# Patient Record
Sex: FEMALE | URBAN - METROPOLITAN AREA
[De-identification: names, ages, dates, MRNs, and addresses within clinical notes are randomized per-mention and may not be internally consistent; named-entity substitution may affect disease eponyms.]

---

## 2017-12-15 ENCOUNTER — FOLLOW UP (OUTPATIENT)
Dept: URBAN - METROPOLITAN AREA CLINIC 11 | Facility: CLINIC | Age: 72
End: 2017-12-15

## 2017-12-15 DIAGNOSIS — H43.813: ICD-10-CM

## 2017-12-15 PROCEDURE — 92014 COMPRE OPH EXAM EST PT 1/>: CPT

## 2017-12-15 ASSESSMENT — VISUAL ACUITY
OD_SC: 20/30-1
OS_PH: 20/25-2
OD_PH: 20/25-2
OS_SC: 20/60-1

## 2017-12-15 ASSESSMENT — TONOMETRY
OS_IOP_MMHG: 16
OD_IOP_MMHG: 15

## 2018-12-17 ENCOUNTER — FOLLOW UP (OUTPATIENT)
Dept: URBAN - METROPOLITAN AREA CLINIC 11 | Facility: CLINIC | Age: 73
End: 2018-12-17

## 2018-12-17 DIAGNOSIS — H43.813: ICD-10-CM

## 2018-12-17 DIAGNOSIS — Z98.890: ICD-10-CM

## 2018-12-17 PROCEDURE — 92014 COMPRE OPH EXAM EST PT 1/>: CPT

## 2018-12-17 PROCEDURE — 92226 OPHTHALMOSCOPY (SUB): CPT

## 2018-12-17 ASSESSMENT — VISUAL ACUITY
OS_SC: 20/100-2
OD_PH: 20/30-2
OD_SC: 20/40
OS_PH: 20/30-2

## 2018-12-17 ASSESSMENT — TONOMETRY
OS_IOP_MMHG: 16
OD_IOP_MMHG: 14

## 2019-12-09 ENCOUNTER — FOLLOW UP (OUTPATIENT)
Dept: URBAN - METROPOLITAN AREA CLINIC 11 | Facility: CLINIC | Age: 74
End: 2019-12-09

## 2019-12-09 DIAGNOSIS — H43.813: ICD-10-CM

## 2019-12-09 DIAGNOSIS — Z98.890: ICD-10-CM

## 2019-12-09 DIAGNOSIS — H33.302: ICD-10-CM

## 2019-12-09 PROCEDURE — 92014 COMPRE OPH EXAM EST PT 1/>: CPT

## 2019-12-09 PROCEDURE — 92226 OPHTHALMOSCOPY (SUB): CPT

## 2019-12-09 ASSESSMENT — VISUAL ACUITY
OD_SC: 20/50
OS_SC: 20/60
OS_PH: 20/30
OD_PH: 20/30-2

## 2019-12-09 ASSESSMENT — TONOMETRY
OS_IOP_MMHG: 11
OD_IOP_MMHG: 12

## 2020-12-07 ENCOUNTER — FOLLOW UP (OUTPATIENT)
Dept: URBAN - METROPOLITAN AREA CLINIC 11 | Facility: CLINIC | Age: 75
End: 2020-12-07

## 2020-12-07 DIAGNOSIS — Z98.890: ICD-10-CM

## 2020-12-07 DIAGNOSIS — H43.813: ICD-10-CM

## 2020-12-07 DIAGNOSIS — H33.302: ICD-10-CM

## 2020-12-07 PROCEDURE — 92014 COMPRE OPH EXAM EST PT 1/>: CPT

## 2020-12-07 PROCEDURE — 92201 OPSCPY EXTND RTA DRAW UNI/BI: CPT

## 2020-12-07 ASSESSMENT — TONOMETRY
OS_IOP_MMHG: 17
OD_IOP_MMHG: 16

## 2020-12-07 ASSESSMENT — VISUAL ACUITY
OS_PH: 20/30-1
OS_SC: 20/80
OD_PH: 20/30
OD_SC: 20/40

## 2021-01-27 DIAGNOSIS — Z23 ENCOUNTER FOR IMMUNIZATION: ICD-10-CM

## 2021-02-03 ENCOUNTER — IMMUNIZATIONS (OUTPATIENT)
Dept: FAMILY MEDICINE CLINIC | Facility: HOSPITAL | Age: 76
End: 2021-02-03

## 2021-02-03 DIAGNOSIS — Z23 ENCOUNTER FOR IMMUNIZATION: Primary | ICD-10-CM

## 2021-02-03 PROCEDURE — 91300 SARS-COV-2 / COVID-19 MRNA VACCINE (PFIZER-BIONTECH) 30 MCG: CPT

## 2021-02-03 PROCEDURE — 0001A SARS-COV-2 / COVID-19 MRNA VACCINE (PFIZER-BIONTECH) 30 MCG: CPT

## 2021-02-23 ENCOUNTER — IMMUNIZATIONS (OUTPATIENT)
Dept: FAMILY MEDICINE CLINIC | Facility: HOSPITAL | Age: 76
End: 2021-02-23

## 2021-02-23 DIAGNOSIS — Z23 ENCOUNTER FOR IMMUNIZATION: Primary | ICD-10-CM

## 2021-02-23 PROCEDURE — 91300 SARS-COV-2 / COVID-19 MRNA VACCINE (PFIZER-BIONTECH) 30 MCG: CPT

## 2021-02-23 PROCEDURE — 0002A SARS-COV-2 / COVID-19 MRNA VACCINE (PFIZER-BIONTECH) 30 MCG: CPT

## 2021-12-06 ENCOUNTER — 1 YEAR FOLLOW-UP (OUTPATIENT)
Dept: URBAN - METROPOLITAN AREA CLINIC 11 | Facility: CLINIC | Age: 76
End: 2021-12-06

## 2021-12-06 DIAGNOSIS — Z98.890: ICD-10-CM

## 2021-12-06 DIAGNOSIS — H33.302: ICD-10-CM

## 2021-12-06 DIAGNOSIS — H43.813: ICD-10-CM

## 2021-12-06 DIAGNOSIS — Z96.1: ICD-10-CM

## 2021-12-06 PROCEDURE — 92014 COMPRE OPH EXAM EST PT 1/>: CPT

## 2021-12-06 PROCEDURE — 92201 OPSCPY EXTND RTA DRAW UNI/BI: CPT

## 2021-12-06 ASSESSMENT — VISUAL ACUITY
OD_SC: 20/50
OS_SC: 20/200
OS_PH: 20/40
OD_PH: 20/30

## 2021-12-06 ASSESSMENT — TONOMETRY
OS_IOP_MMHG: 12
OD_IOP_MMHG: 14

## 2022-12-05 ENCOUNTER — 1 YEAR FOLLOW-UP (OUTPATIENT)
Dept: URBAN - METROPOLITAN AREA CLINIC 11 | Facility: CLINIC | Age: 77
End: 2022-12-05

## 2022-12-05 DIAGNOSIS — H43.813: ICD-10-CM

## 2022-12-05 DIAGNOSIS — Z96.1: ICD-10-CM

## 2022-12-05 DIAGNOSIS — H33.302: ICD-10-CM

## 2022-12-05 DIAGNOSIS — Z98.890: ICD-10-CM

## 2022-12-05 PROCEDURE — 92201 OPSCPY EXTND RTA DRAW UNI/BI: CPT

## 2022-12-05 PROCEDURE — 92014 COMPRE OPH EXAM EST PT 1/>: CPT

## 2022-12-05 ASSESSMENT — VISUAL ACUITY
OS_PH: 20/40
OD_SC: 20/30

## 2022-12-05 ASSESSMENT — TONOMETRY
OD_IOP_MMHG: 17
OS_IOP_MMHG: 19

## 2023-10-25 ENCOUNTER — INPATIENT (INPATIENT)
Facility: HOSPITAL | Age: 78
LOS: 2 days | Discharge: NURSING, REHAB OR LONG-TERM CARE | DRG: 536 | End: 2023-10-28
Attending: STUDENT IN AN ORGANIZED HEALTH CARE EDUCATION/TRAINING PROGRAM | Admitting: INTERNAL MEDICINE
Payer: MEDICARE

## 2023-10-25 VITALS
HEART RATE: 74 BPM | OXYGEN SATURATION: 99 % | TEMPERATURE: 98 F | RESPIRATION RATE: 18 BRPM | WEIGHT: 119.93 LBS | DIASTOLIC BLOOD PRESSURE: 71 MMHG | SYSTOLIC BLOOD PRESSURE: 139 MMHG

## 2023-10-25 LAB
ANION GAP SERPL CALC-SCNC: 8 MMOL/L — LOW (ref 9–16)
ANION GAP SERPL CALC-SCNC: 8 MMOL/L — LOW (ref 9–16)
BUN SERPL-MCNC: 17 MG/DL — SIGNIFICANT CHANGE UP (ref 7–23)
BUN SERPL-MCNC: 17 MG/DL — SIGNIFICANT CHANGE UP (ref 7–23)
CALCIUM SERPL-MCNC: 9 MG/DL — SIGNIFICANT CHANGE UP (ref 8.5–10.5)
CALCIUM SERPL-MCNC: 9 MG/DL — SIGNIFICANT CHANGE UP (ref 8.5–10.5)
CHLORIDE SERPL-SCNC: 100 MMOL/L — SIGNIFICANT CHANGE UP (ref 96–108)
CHLORIDE SERPL-SCNC: 100 MMOL/L — SIGNIFICANT CHANGE UP (ref 96–108)
CO2 SERPL-SCNC: 29 MMOL/L — SIGNIFICANT CHANGE UP (ref 22–31)
CO2 SERPL-SCNC: 29 MMOL/L — SIGNIFICANT CHANGE UP (ref 22–31)
CREAT SERPL-MCNC: 0.81 MG/DL — SIGNIFICANT CHANGE UP (ref 0.5–1.3)
CREAT SERPL-MCNC: 0.81 MG/DL — SIGNIFICANT CHANGE UP (ref 0.5–1.3)
EGFR: 73 ML/MIN/1.73M2 — SIGNIFICANT CHANGE UP
EGFR: 73 ML/MIN/1.73M2 — SIGNIFICANT CHANGE UP
GLUCOSE SERPL-MCNC: 139 MG/DL — HIGH (ref 70–99)
GLUCOSE SERPL-MCNC: 139 MG/DL — HIGH (ref 70–99)
HCT VFR BLD CALC: 33.1 % — LOW (ref 34.5–45)
HCT VFR BLD CALC: 33.1 % — LOW (ref 34.5–45)
HGB BLD-MCNC: 10.7 G/DL — LOW (ref 11.5–15.5)
HGB BLD-MCNC: 10.7 G/DL — LOW (ref 11.5–15.5)
MAGNESIUM SERPL-MCNC: 1.7 MG/DL — SIGNIFICANT CHANGE UP (ref 1.6–2.6)
MAGNESIUM SERPL-MCNC: 1.7 MG/DL — SIGNIFICANT CHANGE UP (ref 1.6–2.6)
MCHC RBC-ENTMCNC: 32.3 GM/DL — SIGNIFICANT CHANGE UP (ref 32–36)
MCHC RBC-ENTMCNC: 32.3 GM/DL — SIGNIFICANT CHANGE UP (ref 32–36)
MCHC RBC-ENTMCNC: 32.5 PG — SIGNIFICANT CHANGE UP (ref 27–34)
MCHC RBC-ENTMCNC: 32.5 PG — SIGNIFICANT CHANGE UP (ref 27–34)
MCV RBC AUTO: 100.6 FL — HIGH (ref 80–100)
MCV RBC AUTO: 100.6 FL — HIGH (ref 80–100)
NRBC # BLD: 0 /100 WBCS — SIGNIFICANT CHANGE UP (ref 0–0)
NRBC # BLD: 0 /100 WBCS — SIGNIFICANT CHANGE UP (ref 0–0)
PLATELET # BLD AUTO: 215 K/UL — SIGNIFICANT CHANGE UP (ref 150–400)
PLATELET # BLD AUTO: 215 K/UL — SIGNIFICANT CHANGE UP (ref 150–400)
POTASSIUM SERPL-MCNC: 3.8 MMOL/L — SIGNIFICANT CHANGE UP (ref 3.5–5.3)
POTASSIUM SERPL-MCNC: 3.8 MMOL/L — SIGNIFICANT CHANGE UP (ref 3.5–5.3)
POTASSIUM SERPL-SCNC: 3.8 MMOL/L — SIGNIFICANT CHANGE UP (ref 3.5–5.3)
POTASSIUM SERPL-SCNC: 3.8 MMOL/L — SIGNIFICANT CHANGE UP (ref 3.5–5.3)
RBC # BLD: 3.29 M/UL — LOW (ref 3.8–5.2)
RBC # BLD: 3.29 M/UL — LOW (ref 3.8–5.2)
RBC # FLD: 15.9 % — HIGH (ref 10.3–14.5)
RBC # FLD: 15.9 % — HIGH (ref 10.3–14.5)
SODIUM SERPL-SCNC: 137 MMOL/L — SIGNIFICANT CHANGE UP (ref 132–145)
SODIUM SERPL-SCNC: 137 MMOL/L — SIGNIFICANT CHANGE UP (ref 132–145)
TSH SERPL-MCNC: 2.97 UIU/ML — SIGNIFICANT CHANGE UP (ref 0.36–3.74)
TSH SERPL-MCNC: 2.97 UIU/ML — SIGNIFICANT CHANGE UP (ref 0.36–3.74)
WBC # BLD: 7.28 K/UL — SIGNIFICANT CHANGE UP (ref 3.8–10.5)
WBC # BLD: 7.28 K/UL — SIGNIFICANT CHANGE UP (ref 3.8–10.5)
WBC # FLD AUTO: 7.28 K/UL — SIGNIFICANT CHANGE UP (ref 3.8–10.5)
WBC # FLD AUTO: 7.28 K/UL — SIGNIFICANT CHANGE UP (ref 3.8–10.5)

## 2023-10-25 PROCEDURE — 73502 X-RAY EXAM HIP UNI 2-3 VIEWS: CPT | Mod: 26,LT

## 2023-10-25 PROCEDURE — 99285 EMERGENCY DEPT VISIT HI MDM: CPT

## 2023-10-25 PROCEDURE — 99223 1ST HOSP IP/OBS HIGH 75: CPT | Mod: GC

## 2023-10-25 RX ORDER — INFLUENZA VIRUS VACCINE 15; 15; 15; 15 UG/.5ML; UG/.5ML; UG/.5ML; UG/.5ML
0.7 SUSPENSION INTRAMUSCULAR ONCE
Refills: 0 | Status: DISCONTINUED | OUTPATIENT
Start: 2023-10-25 | End: 2023-10-28

## 2023-10-25 RX ORDER — IBUPROFEN 200 MG
400 TABLET ORAL ONCE
Refills: 0 | Status: COMPLETED | OUTPATIENT
Start: 2023-10-25 | End: 2023-10-25

## 2023-10-25 RX ORDER — LIDOCAINE 4 G/100G
1 CREAM TOPICAL DAILY
Refills: 0 | Status: DISCONTINUED | OUTPATIENT
Start: 2023-10-25 | End: 2023-10-26

## 2023-10-25 RX ORDER — ACETAMINOPHEN 500 MG
650 TABLET ORAL EVERY 6 HOURS
Refills: 0 | Status: DISCONTINUED | OUTPATIENT
Start: 2023-10-25 | End: 2023-10-28

## 2023-10-25 RX ADMIN — Medication 400 MILLIGRAM(S): at 19:37

## 2023-10-25 RX ADMIN — LIDOCAINE 1 PATCH: 4 CREAM TOPICAL at 23:33

## 2023-10-25 NOTE — PATIENT PROFILE ADULT - FUNCTIONAL ASSESSMENT - BASIC MOBILITY SECTION LABEL
Visit Information    Have you changed or started any medications since your last visit including any over-the-counter medicines, vitamins, or herbal medicines? no   Have you stopped taking any of your medications? Is so, why? -  no  Are you having any side effects from any of your medications? - no    Have you seen any other physician or provider since your last visit?  no   Have you had any other diagnostic tests since your last visit?  no   Have you been seen in the emergency room and/or had an admission in a hospital since we last saw you?  no   Have you had your routine dental cleaning in the past 6 months?  no     Do you have an active MyChart account? If no, what is the barrier?   Yes    Patient Care Team:  Lon Castleman, APRN - CNP as PCP - General (Family Nurse Practitioner)  Lon Castleman, APRN - CNP as PCP - [facility] Provider    Medical History Review  Past Medical, Family, and Social History reviewed and  contribute to the patient presenting condition    Health Maintenance   Topic Date Due    Hepatitis C screen  Never done    COVID-19 Vaccine (2 - Pfizer series) 12/10/2021    Cervical cancer screen  01/24/2022    Flu vaccine (1) Never done    Depression Screen  03/04/2023    DTaP/Tdap/Td vaccine (3 - Td or Tdap) 06/16/2023    Lipids  10/17/2023    Hepatitis A vaccine  Aged Out    Hib vaccine  Aged Out    Meningococcal (ACWY) vaccine  Aged Out    Pneumococcal 0-64 years Vaccine  Aged Out    HIV screen  Discontinued .

## 2023-10-25 NOTE — ED PROVIDER NOTE - CLINICAL SUMMARY MEDICAL DECISION MAKING FREE TEXT BOX
78-year-old female presenting after a mechanical fall onto her left hip found to have a superior and inferior pubic ramus fracture.  Pain is not significant while at rest however she is unable to bear weight she lives in a fourth floor walk up apartment alone.  I do not feel that she has a safe discharge.  We will plan for admission to medicine service at [facility] for PT evaluation and possible rehab placement.

## 2023-10-25 NOTE — H&P ADULT - PROBLEM SELECTOR PLAN 3
Pt takes Leflunomide (Arava) QD for Rheumatoid Arthritis. Follows Dr. Christian Vivar at [facility] for hx of Non-Hodgkin's Lymphoma in remission.   Plan:  - Heme/Onc consult in AM for continuation of Leflunomide Pt with admission Hgb/Hct of 10.7/33.1. MCV of 100.6. Pt states she has a hx of MAYCOL, has monthly iron shots.   Plan:  - F/u with iron studies in AM, B12/Folate levels  - Transfusion goal > 7  - Maintain 2 active T&S

## 2023-10-25 NOTE — ED ADULT TRIAGE NOTE - CHIEF COMPLAINT QUOTE
here for mechanical slip and fall down  5-6 steps? pt c/o left sided left hip- denies head strike, LOC, neck pain or back pain

## 2023-10-25 NOTE — ED ADULT NURSE NOTE - OBJECTIVE STATEMENT
Pt reports her shoe got stuck on the ground and she fell down about 2 stairs. C/o L groin pain. Reports she thinks she passed out and had nausea after falling. Denies blood thinner use or headstrike.

## 2023-10-25 NOTE — ED PROVIDER NOTE - PHYSICAL EXAMINATION
VITAL SIGNS: I have reviewed nursing notes and confirm.  CONSTITUTIONAL: Well-developed; well-nourished; in no acute distress.  HEAD: Normocephalic; atraumatic.  EYES: EOM intact; conjunctiva and sclera clear.  ENT: nose appears normal  NECK: no midline c-spine tenderness  CARD: Strong DP pulse in the left foot  RESP: breathing comfortably on RA  ABD: soft; non-distended; non-tender  EXT: Tenderness elicited with external rotation of the left hip.  No T or L spine midline tenderness  NEURO: Full strength with plantar and dorsi flexion in the left ankle  PSYCH: Cooperative, appropriate.

## 2023-10-25 NOTE — H&P ADULT - ASSESSMENT
Ms. Redmond is an 77 y/o F with a PMHx of Rheumatoid Arthritis (on Leflunomide), hx of Non-Hodgkin's Lymphoma in 2014 (s/p chemo in remission, s/p thoracic spine surgery from [place]), hypothyroidism, and HLD presenting for fall this afternoon 10/25.

## 2023-10-25 NOTE — H&P ADULT - NSHPPHYSICALEXAM_GEN_ALL_CORE
General: Alert and oriented x 3. No acute distress. Well-nourished and pleasant.   Eyes: EOMI. Anicteric.  HEENT: Moist mucous membranes. No scleral icterus. No cervical lymphadenopathy.  Lungs: Clear to auscultation bilaterally. No accessory muscle use.  Cardiovascular:+ Systolic ejection murmur best auscultated at sternal border. No murmur. No JVD.  Abdomen: Soft, non-tender and non-distended. No palpable masses.  Extremities: No edema. Non-tender.  Skin: No rashes or lesions. Warm.  Neurologic: 3/5 strength on RLE, 2/5 strength on LLE. Full strength in b/l upper extremities. Sensations intact.   Psychiatric: Cooperative. Appropriate mood and affect.

## 2023-10-25 NOTE — H&P ADULT - HISTORY OF PRESENT ILLNESS
ED Course:  ED Vitals: T 97.6, HR 74, /71, satting 99% on RA   Pertinent labs: WBC 7.28, Hgb/Hct of 10.7/33.1, Platelets 215; Na 137, K 3.8, Cl 100, CO2 29, BUN/Cr 17/0.81, Glucose 139  Pt given Ibuprofen 400mg 1x  Hip X-Ray: Acute displaced leftsuperior and inferior pubic rami fractures. No other fractures. No advanced arthritic changes.  Admitted to [facility] for weakness in the setting of confirmed pelvic fracture  Ms. Redmond is an 77 y/o F with a PMHx of Rheumatoid Arthritis (on Leflunomide), hx of Non-Hodgkin's Lymphoma in 2014 (s/p chemo in remission, s/p thoracic spine surgery from [place]), hypothyroidism, and HLD presenting for fall this afternoon 10/25. Pt was in her usual state of health on the sidewalk of her apartment on [address] when she misplaced her step and fell onto the sidewalk. Pt denies head trauma, hand trauma, states she fell directly on the L region of her pelvis, and denies LOC, dizziness, preceding palpitations, blurry vision. Pt was helped to a seated position by bystanders, was told she had questionable syncopal episode lasting several seconds but pt herself has no recollection of such events.     On ROS, pt denies fevers, chills, CP, SOB, abdominal pain, dysuria, hematuria, melena, hematochezia. States she has chronic back pain from a surgery in 2014 where she had "rods placed" in the setting of her Non-Hodgkin's Lymphoma. Endorses pain in her hip only when flexing/extending at the hip. No bleeding. No pain otherwise. Pt states she is not able to walk.     ED Course:  ED Vitals: T 97.6, HR 74, /71, satting 99% on RA   Pertinent labs: WBC 7.28, Hgb/Hct of 10.7/33.1, Platelets 215; Na 137, K 3.8, Cl 100, CO2 29, BUN/Cr 17/0.81, Glucose 139  Pt given Ibuprofen 400mg 1x  Hip X-Ray: Acute displaced leftsuperior and inferior pubic rami fractures. No other fractures. No advanced arthritic changes.  Admitted to [facility] for weakness in the setting of confirmed pelvic fracture

## 2023-10-25 NOTE — H&P ADULT - PROBLEM SELECTOR PLAN 2
Hip X-Ray 10/25: Acute displaced leftsuperior and inferior pubic rami fractures. No other fractures. No advanced arthritic changes.   Plan:  - Ortho consulted, appreciate recs  - Pain control as above  - PT/OT consult in AM Hip X-Ray 10/25: Acute displaced left superior and inferior pubic rami fractures. No other fractures. No advanced arthritic changes.   Plan:  - Ortho consulted, appreciate recs  - Pain control as above  - PT/OT consult in AM

## 2023-10-25 NOTE — H&P ADULT - PROBLEM SELECTOR PLAN 5
Pt takes Atorvastatin 10mg QD  Plan:  - C/w home medications Pt takes Synthroid 50mcg QD   Plan:  - C/w Synthroid 50mcg in AMs Pt takes Synthroid 50mcg QD   Plan:  - C/w Synthroid 50mcg in AM

## 2023-10-25 NOTE — PATIENT PROFILE ADULT - FALL HARM RISK - HARM RISK INTERVENTIONS

## 2023-10-25 NOTE — H&P ADULT - ATTENDING COMMENTS
#Pubic rami fx: + Acute displaced left superior and inferior pubic rami fractures. MS/Sensation b/l LE wnl. Per ortho no intervention. c/w pain control. PT eval    #Fall: described as mechanical fall, no head strike. No FND on exam. Plan as above. Fall precautions    #MAYCOL: hx of MAYCOL, outpt iron infusions, hgb at baseline.

## 2023-10-25 NOTE — ED ADULT NURSE NOTE - NSFALLRISKINTERV_ED_ALL_ED
Assistance OOB with selected safe patient handling equipment if applicable/Assistance with ambulation/Communicate fall risk and risk factors to all staff, patient, and family/Monitor gait and stability/Provide visual cue: yellow wristband, yellow gown, etc/Reinforce activity limits and safety measures with patient and family/Call bell, personal items and telephone in reach/Instruct patient to call for assistance before getting out of bed/chair/stretcher/Non-slip footwear applied when patient is off stretcher/[place] to call system/Physically safe environment - no spills, clutter or unnecessary equipment/Purposeful Proactive Rounding/Room/bathroom lighting operational, light cord in reach

## 2023-10-25 NOTE — H&P ADULT - NSHPLABSRESULTS_GEN_ALL_CORE
.  LABS:                         10.7   7.28  )-----------( 215      ( 25 Oct 2023 19:08 )             33.1     10-25    137  |  100  |  17  ----------------------------<  139<H>  3.8   |  29  |  0.81    Ca    9.0      25 Oct 2023 19:08  Mg     1.7     10-25        Urinalysis Basic - ( 25 Oct 2023 19:08 )    Color: x / Appearance: x / SG: x / pH: x  Gluc: 139 mg/dL / Ketone: x  / Bili: x / Urobili: x   Blood: x / Protein: x / Nitrite: x   Leuk Esterase: x / RBC: x / WBC x   Sq Epi: x / Non Sq Epi: x / Bacteria: x                RADIOLOGY, EKG & ADDITIONAL TESTS: Reviewed.

## 2023-10-25 NOTE — H&P ADULT - PROBLEM SELECTOR PLAN 1
Pt reports misplacing her step on curb this afternoon 10/25. Pt with decreased strength in b/l lower extremities. Reports no pain unless she moves her hips (flexion/extension)   Plan:  - Tylenol 650mg Q4h PRN for mild pain  - Toradol 10mg IVP Q4h PRN for moderate pain  - Oxycodone 5mg PO Q6h PRN for severe pain   - Lidocaine patches  - Fall precautions Pt reports misplacing her step on curb this afternoon 10/25. Pt with decreased strength in b/l lower extremities. Reports no pain unless she moves her hips (flexion/extension). EKG showed no NSR with normal axis, no AUNG/STD.   Plan:  - Tylenol 650mg Q4h PRN for mild pain  - Toradol 10mg IVP Q4h PRN for moderate pain  - Oxycodone 5mg PO Q6h PRN for severe pain   - Lidocaine patches  - Fall precautions  - PT/OT consult in AM

## 2023-10-25 NOTE — ED ADULT TRIAGE NOTE - NS ED NURSE AMBULANCES
Attending Attestation (For Attendings USE Only)... Attending Attestation (For Attendings USE Only)... [facility]

## 2023-10-25 NOTE — ED PROVIDER NOTE - OBJECTIVE STATEMENT
78-year-old female presenting for mechanical fall.  She states she was walking down the stairs and her shoe got caught.  This resulted in her falling onto the left hip.  She was unable to stand back up after the fall.  She denies any neck or back pain.  She did not hit her head.  No prior injuries to the left lower extremity in the past.  She states that she feels pain mostly in the groin.

## 2023-10-25 NOTE — H&P ADULT - PROBLEM SELECTOR PLAN 6
F: Tolerating PO  E: Replete PRN K>3.5, Mg>1.6  GI: None  Diet: Regular  DVT: SCDs   Dispo: [facility] Pt takes Atorvastatin 10mg QD  Plan:  - C/w home medications

## 2023-10-25 NOTE — H&P ADULT - NSHPSOCIALHISTORY_GEN_ALL_CORE
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 Christian Vivar [facility]

## 2023-10-25 NOTE — H&P ADULT - PROBLEM SELECTOR PLAN 4
Pt takes Synthroid 50mcg QD   Plan:  - C/w Synthroid 50mcg in AMs Pt takes Leflunomide (Arava) QD for Rheumatoid Arthritis. Follows Dr. Christian Vivar at [facility] for hx of Non-Hodgkin's Lymphoma in remission.   Plan:  - Heme/Onc consult in AM for continuation of Leflunomide

## 2023-10-26 DIAGNOSIS — M06.9 RHEUMATOID ARTHRITIS, UNSPECIFIED: ICD-10-CM

## 2023-10-26 DIAGNOSIS — E03.9 HYPOTHYROIDISM, UNSPECIFIED: ICD-10-CM

## 2023-10-26 DIAGNOSIS — D53.9 NUTRITIONAL ANEMIA, UNSPECIFIED: ICD-10-CM

## 2023-10-26 DIAGNOSIS — Z29.9 ENCOUNTER FOR PROPHYLACTIC MEASURES, UNSPECIFIED: ICD-10-CM

## 2023-10-26 DIAGNOSIS — S32.599A OTHER SPECIFIED FRACTURE OF UNSPECIFIED PUBIS, INITIAL ENCOUNTER FOR CLOSED FRACTURE: ICD-10-CM

## 2023-10-26 DIAGNOSIS — D50.9 IRON DEFICIENCY ANEMIA, UNSPECIFIED: ICD-10-CM

## 2023-10-26 DIAGNOSIS — E78.5 HYPERLIPIDEMIA, UNSPECIFIED: ICD-10-CM
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
## 2023-10-26 LAB
ANION GAP SERPL CALC-SCNC: 8 MMOL/L — SIGNIFICANT CHANGE UP (ref 5–17)
ANION GAP SERPL CALC-SCNC: 8 MMOL/L — SIGNIFICANT CHANGE UP (ref 5–17)
BASOPHILS # BLD AUTO: 0.1 K/UL — SIGNIFICANT CHANGE UP (ref 0–0.2)
BASOPHILS # BLD AUTO: 0.1 K/UL — SIGNIFICANT CHANGE UP (ref 0–0.2)
BASOPHILS NFR BLD AUTO: 1.8 % — SIGNIFICANT CHANGE UP (ref 0–2)
BASOPHILS NFR BLD AUTO: 1.8 % — SIGNIFICANT CHANGE UP (ref 0–2)
BLD GP AB SCN SERPL QL: NEGATIVE — SIGNIFICANT CHANGE UP
BLD GP AB SCN SERPL QL: NEGATIVE — SIGNIFICANT CHANGE UP
BUN SERPL-MCNC: 11 MG/DL — SIGNIFICANT CHANGE UP (ref 7–23)
BUN SERPL-MCNC: 11 MG/DL — SIGNIFICANT CHANGE UP (ref 7–23)
CALCIUM SERPL-MCNC: 8.7 MG/DL — SIGNIFICANT CHANGE UP (ref 8.4–10.5)
CALCIUM SERPL-MCNC: 8.7 MG/DL — SIGNIFICANT CHANGE UP (ref 8.4–10.5)
CHLORIDE SERPL-SCNC: 102 MMOL/L — SIGNIFICANT CHANGE UP (ref 96–108)
CHLORIDE SERPL-SCNC: 102 MMOL/L — SIGNIFICANT CHANGE UP (ref 96–108)
CO2 SERPL-SCNC: 27 MMOL/L — SIGNIFICANT CHANGE UP (ref 22–31)
CO2 SERPL-SCNC: 27 MMOL/L — SIGNIFICANT CHANGE UP (ref 22–31)
CREAT SERPL-MCNC: 0.63 MG/DL — SIGNIFICANT CHANGE UP (ref 0.5–1.3)
CREAT SERPL-MCNC: 0.63 MG/DL — SIGNIFICANT CHANGE UP (ref 0.5–1.3)
EGFR: 90 ML/MIN/1.73M2 — SIGNIFICANT CHANGE UP
EGFR: 90 ML/MIN/1.73M2 — SIGNIFICANT CHANGE UP
EOSINOPHIL # BLD AUTO: 0.45 K/UL — SIGNIFICANT CHANGE UP (ref 0–0.5)
EOSINOPHIL # BLD AUTO: 0.45 K/UL — SIGNIFICANT CHANGE UP (ref 0–0.5)
EOSINOPHIL NFR BLD AUTO: 8.2 % — HIGH (ref 0–6)
EOSINOPHIL NFR BLD AUTO: 8.2 % — HIGH (ref 0–6)
FERRITIN SERPL-MCNC: 224 NG/ML — SIGNIFICANT CHANGE UP (ref 13–330)
FERRITIN SERPL-MCNC: 224 NG/ML — SIGNIFICANT CHANGE UP (ref 13–330)
FOLATE SERPL-MCNC: 11.5 NG/ML — SIGNIFICANT CHANGE UP
FOLATE SERPL-MCNC: 11.5 NG/ML — SIGNIFICANT CHANGE UP
GLUCOSE SERPL-MCNC: 89 MG/DL — SIGNIFICANT CHANGE UP (ref 70–99)
GLUCOSE SERPL-MCNC: 89 MG/DL — SIGNIFICANT CHANGE UP (ref 70–99)
HCT VFR BLD CALC: 31.1 % — LOW (ref 34.5–45)
HCT VFR BLD CALC: 31.1 % — LOW (ref 34.5–45)
HGB BLD-MCNC: 10.2 G/DL — LOW (ref 11.5–15.5)
HGB BLD-MCNC: 10.2 G/DL — LOW (ref 11.5–15.5)
IRON SATN MFR SERPL: 29 % — SIGNIFICANT CHANGE UP (ref 14–50)
IRON SATN MFR SERPL: 29 % — SIGNIFICANT CHANGE UP (ref 14–50)
IRON SATN MFR SERPL: 68 UG/DL — SIGNIFICANT CHANGE UP (ref 30–160)
IRON SATN MFR SERPL: 68 UG/DL — SIGNIFICANT CHANGE UP (ref 30–160)
LYMPHOCYTES # BLD AUTO: 0.54 K/UL — LOW (ref 1–3.3)
LYMPHOCYTES # BLD AUTO: 0.54 K/UL — LOW (ref 1–3.3)
LYMPHOCYTES # BLD AUTO: 10 % — LOW (ref 13–44)
LYMPHOCYTES # BLD AUTO: 10 % — LOW (ref 13–44)
MAGNESIUM SERPL-MCNC: 1.9 MG/DL — SIGNIFICANT CHANGE UP (ref 1.6–2.6)
MAGNESIUM SERPL-MCNC: 1.9 MG/DL — SIGNIFICANT CHANGE UP (ref 1.6–2.6)
MANUAL SMEAR VERIFICATION: SIGNIFICANT CHANGE UP
MANUAL SMEAR VERIFICATION: SIGNIFICANT CHANGE UP
MCHC RBC-ENTMCNC: 32.6 PG — SIGNIFICANT CHANGE UP (ref 27–34)
MCHC RBC-ENTMCNC: 32.6 PG — SIGNIFICANT CHANGE UP (ref 27–34)
MCHC RBC-ENTMCNC: 32.8 GM/DL — SIGNIFICANT CHANGE UP (ref 32–36)
MCHC RBC-ENTMCNC: 32.8 GM/DL — SIGNIFICANT CHANGE UP (ref 32–36)
MCV RBC AUTO: 99.4 FL — SIGNIFICANT CHANGE UP (ref 80–100)
MCV RBC AUTO: 99.4 FL — SIGNIFICANT CHANGE UP (ref 80–100)
METAMYELOCYTES # FLD: 1.8 % — HIGH (ref 0–0)
METAMYELOCYTES # FLD: 1.8 % — HIGH (ref 0–0)
MONOCYTES # BLD AUTO: 0.79 K/UL — SIGNIFICANT CHANGE UP (ref 0–0.9)
MONOCYTES # BLD AUTO: 0.79 K/UL — SIGNIFICANT CHANGE UP (ref 0–0.9)
MONOCYTES NFR BLD AUTO: 14.6 % — HIGH (ref 2–14)
MONOCYTES NFR BLD AUTO: 14.6 % — HIGH (ref 2–14)
NEUTROPHILS # BLD AUTO: 3.45 K/UL — SIGNIFICANT CHANGE UP (ref 1.8–7.4)
NEUTROPHILS # BLD AUTO: 3.45 K/UL — SIGNIFICANT CHANGE UP (ref 1.8–7.4)
NEUTROPHILS NFR BLD AUTO: 63.6 % — SIGNIFICANT CHANGE UP (ref 43–77)
NEUTROPHILS NFR BLD AUTO: 63.6 % — SIGNIFICANT CHANGE UP (ref 43–77)
OVALOCYTES BLD QL SMEAR: SLIGHT — SIGNIFICANT CHANGE UP
OVALOCYTES BLD QL SMEAR: SLIGHT — SIGNIFICANT CHANGE UP
PHOSPHATE SERPL-MCNC: 2.9 MG/DL — SIGNIFICANT CHANGE UP (ref 2.5–4.5)
PHOSPHATE SERPL-MCNC: 2.9 MG/DL — SIGNIFICANT CHANGE UP (ref 2.5–4.5)
PLAT MORPH BLD: NORMAL — SIGNIFICANT CHANGE UP
PLAT MORPH BLD: NORMAL — SIGNIFICANT CHANGE UP
PLATELET # BLD AUTO: 200 K/UL — SIGNIFICANT CHANGE UP (ref 150–400)
PLATELET # BLD AUTO: 200 K/UL — SIGNIFICANT CHANGE UP (ref 150–400)
POTASSIUM SERPL-MCNC: 3.7 MMOL/L — SIGNIFICANT CHANGE UP (ref 3.5–5.3)
POTASSIUM SERPL-MCNC: 3.7 MMOL/L — SIGNIFICANT CHANGE UP (ref 3.5–5.3)
POTASSIUM SERPL-SCNC: 3.7 MMOL/L — SIGNIFICANT CHANGE UP (ref 3.5–5.3)
POTASSIUM SERPL-SCNC: 3.7 MMOL/L — SIGNIFICANT CHANGE UP (ref 3.5–5.3)
RBC # BLD: 3.13 M/UL — LOW (ref 3.8–5.2)
RBC # BLD: 3.13 M/UL — LOW (ref 3.8–5.2)
RBC # FLD: 16 % — HIGH (ref 10.3–14.5)
RBC # FLD: 16 % — HIGH (ref 10.3–14.5)
RBC BLD AUTO: ABNORMAL
RBC BLD AUTO: ABNORMAL
RH IG SCN BLD-IMP: POSITIVE — SIGNIFICANT CHANGE UP
RH IG SCN BLD-IMP: POSITIVE — SIGNIFICANT CHANGE UP
SODIUM SERPL-SCNC: 137 MMOL/L — SIGNIFICANT CHANGE UP (ref 135–145)
SODIUM SERPL-SCNC: 137 MMOL/L — SIGNIFICANT CHANGE UP (ref 135–145)
SPHEROCYTES BLD QL SMEAR: SLIGHT — SIGNIFICANT CHANGE UP
SPHEROCYTES BLD QL SMEAR: SLIGHT — SIGNIFICANT CHANGE UP
TIBC SERPL-MCNC: 232 UG/DL — SIGNIFICANT CHANGE UP (ref 220–430)
TIBC SERPL-MCNC: 232 UG/DL — SIGNIFICANT CHANGE UP (ref 220–430)
TRANSFERRIN SERPL-MCNC: 183 MG/DL — LOW (ref 200–360)
TRANSFERRIN SERPL-MCNC: 183 MG/DL — LOW (ref 200–360)
UIBC SERPL-MCNC: 164 UG/DL — SIGNIFICANT CHANGE UP (ref 110–370)
UIBC SERPL-MCNC: 164 UG/DL — SIGNIFICANT CHANGE UP (ref 110–370)
VIT B12 SERPL-MCNC: 914 PG/ML — SIGNIFICANT CHANGE UP (ref 232–1245)
VIT B12 SERPL-MCNC: 914 PG/ML — SIGNIFICANT CHANGE UP (ref 232–1245)
WBC # BLD: 5.43 K/UL — SIGNIFICANT CHANGE UP (ref 3.8–10.5)
WBC # BLD: 5.43 K/UL — SIGNIFICANT CHANGE UP (ref 3.8–10.5)
WBC # FLD AUTO: 5.43 K/UL — SIGNIFICANT CHANGE UP (ref 3.8–10.5)
WBC # FLD AUTO: 5.43 K/UL — SIGNIFICANT CHANGE UP (ref 3.8–10.5)

## 2023-10-26 PROCEDURE — 72192 CT PELVIS W/O DYE: CPT | Mod: 26

## 2023-10-26 PROCEDURE — 99233 SBSQ HOSP IP/OBS HIGH 50: CPT | Mod: GC

## 2023-10-26 RX ORDER — ATORVASTATIN CALCIUM 80 MG/1
1 TABLET, FILM COATED ORAL
Refills: 0 | DISCHARGE

## 2023-10-26 RX ORDER — ENOXAPARIN SODIUM 100 MG/ML
40 INJECTION SUBCUTANEOUS EVERY 24 HOURS
Refills: 0 | Status: DISCONTINUED | OUTPATIENT
Start: 2023-10-26 | End: 2023-10-28

## 2023-10-26 RX ORDER — LEVOTHYROXINE SODIUM 125 MCG
1 TABLET ORAL
Refills: 0 | DISCHARGE

## 2023-10-26 RX ORDER — LIDOCAINE 4 G/100G
2 CREAM TOPICAL DAILY
Refills: 0 | Status: DISCONTINUED | OUTPATIENT
Start: 2023-10-26 | End: 2023-10-28

## 2023-10-26 RX ORDER — KETOROLAC TROMETHAMINE 30 MG/ML
15 SYRINGE (ML) INJECTION EVERY 4 HOURS
Refills: 0 | Status: DISCONTINUED | OUTPATIENT
Start: 2023-10-26 | End: 2023-10-28

## 2023-10-26 RX ORDER — LEVOTHYROXINE SODIUM 125 MCG
50 TABLET ORAL DAILY
Refills: 0 | Status: DISCONTINUED | OUTPATIENT
Start: 2023-10-26 | End: 2023-10-28

## 2023-10-26 RX ORDER — PANTOPRAZOLE SODIUM 20 MG/1
40 TABLET, DELAYED RELEASE ORAL
Refills: 0 | Status: DISCONTINUED | OUTPATIENT
Start: 2023-10-26 | End: 2023-10-28

## 2023-10-26 RX ORDER — OXYCODONE HYDROCHLORIDE 5 MG/1
5 TABLET ORAL EVERY 6 HOURS
Refills: 0 | Status: DISCONTINUED | OUTPATIENT
Start: 2023-10-26 | End: 2023-10-28

## 2023-10-26 RX ORDER — ATORVASTATIN CALCIUM 80 MG/1
10 TABLET, FILM COATED ORAL AT BEDTIME
Refills: 0 | Status: DISCONTINUED | OUTPATIENT
Start: 2023-10-26 | End: 2023-10-28

## 2023-10-26 RX ORDER — LEFLUNOMIDE 10 MG/1
1 TABLET ORAL
Refills: 0 | DISCHARGE

## 2023-10-26 RX ADMIN — LIDOCAINE 1 PATCH: 4 CREAM TOPICAL at 07:32

## 2023-10-26 RX ADMIN — LIDOCAINE 2 PATCH: 4 CREAM TOPICAL at 12:07

## 2023-10-26 RX ADMIN — LIDOCAINE 2 PATCH: 4 CREAM TOPICAL at 19:40

## 2023-10-26 RX ADMIN — Medication 15 MILLIGRAM(S): at 19:08

## 2023-10-26 RX ADMIN — ATORVASTATIN CALCIUM 10 MILLIGRAM(S): 80 TABLET, FILM COATED ORAL at 21:38

## 2023-10-26 RX ADMIN — PANTOPRAZOLE SODIUM 40 MILLIGRAM(S): 20 TABLET, DELAYED RELEASE ORAL at 06:46

## 2023-10-26 RX ADMIN — LIDOCAINE 1 PATCH: 4 CREAM TOPICAL at 12:11

## 2023-10-26 RX ADMIN — ENOXAPARIN SODIUM 40 MILLIGRAM(S): 100 INJECTION SUBCUTANEOUS at 21:39

## 2023-10-26 RX ADMIN — Medication 650 MILLIGRAM(S): at 06:47

## 2023-10-26 RX ADMIN — Medication 15 MILLIGRAM(S): at 19:23

## 2023-10-26 RX ADMIN — LIDOCAINE 2 PATCH: 4 CREAM TOPICAL at 00:16

## 2023-10-26 RX ADMIN — Medication 50 MICROGRAM(S): at 05:48

## 2023-10-26 RX ADMIN — Medication 650 MILLIGRAM(S): at 07:46

## 2023-10-26 NOTE — PHYSICAL THERAPY INITIAL EVALUATION ADULT - ADDITIONAL COMMENTS
[place] in [place] - [place] return [place] post [facility] discharge. [place] home includes 2 stories w/ 2 AUNG. Primarily amb indep w/o AD. Denied falls within past 6 months despite recent fall prior to H admission.

## 2023-10-26 NOTE — PHYSICAL THERAPY INITIAL EVALUATION ADULT - GAIT DEVIATIONS NOTED, PT EVAL
Pt w/ dec weight shifting abilities/slightly unsteady gait however no LOB/falls or knee buckling observed. Assist w/ RW management especially during turns./decreased cecilia/decreased velocity of limb motion/decreased weight-shifting ability

## 2023-10-26 NOTE — PROGRESS NOTE ADULT - ATTENDING COMMENTS
79 y/o F with a PMHx of Rheumatoid Arthritis (on Leflunomide), hx of Non-Hodgkin's Lymphoma in 2014 (s/p chemo in remission, s/p thoracic spine surgery from [place]), hypothyroidism, and HLD presenting after experiencing a fall, found with acute displaced left superior and inferior pubic rami fractures    #Pubic rami fracture  #Fall  - fall was mechanical in nature, tripped on curb  - no acute intervention per ortho   - pain well controlled on current regimen  - f/u vitamin D level  - PT rec MEGHAN patient would prefer [facility] in [facility] where she has more social support    Remainder as above

## 2023-10-26 NOTE — OCCUPATIONAL THERAPY INITIAL EVALUATION ADULT - MODIFIED CLINICAL TEST OF SENSORY INTEGRATION IN BALANCE TEST
Pt. performed functional mob in room w. Min Ax1 and RW, noted with decreased step length, required physical assist intermittently to navigate RW

## 2023-10-26 NOTE — OCCUPATIONAL THERAPY INITIAL EVALUATION ADULT - PLANNED THERAPY INTERVENTIONS, OT EVAL
ADL retraining/IADL retraining/balance training/bed mobility training/neuromuscular re-education/ROM/strengthening/transfer training

## 2023-10-26 NOTE — PHYSICAL THERAPY INITIAL EVALUATION ADULT - PERTINENT HX OF CURRENT PROBLEM, REHAB EVAL
79 y/o F with a PMHx of Rheumatoid Arthritis (on Leflunomide), hx of Non-Hodgkin's Lymphoma in 2014 (s/p chemo in remission, s/p thoracic spine surgery from [place]), hypothyroidism, and HLD presenting for fall this afternoon 10/25. Pt was in her usual state of health on the sidewalk of her apartment on [address] when she misplaced her step and fell onto the sidewalk. Pt denies head trauma, hand trauma, states she fell directly on the L region of her pelvis, and denies LOC, dizziness, preceding palpitations, blurry vision. Pt was helped to a seated position by bystanders, was told she had questionable syncopal episode lasting several seconds but pt herself has no recollection of such events.     X-Ray: Acute displaced left superior and inferior pubic rami fractures. No other fractures. No advanced arthritic changes    On ROS, pt denies fevers, chills, CP, SOB, abdominal pain, dysuria, hematuria, melena, hematochezia. States she has chronic back pain from a surgery in 2014 where she had "rods placed" in the setting of her Non-Hodgkin's Lymphoma. Endorses pain in her hip only when flexing/extending at the hip. No bleeding. No pain otherwise. Pt states she is not able to walk.

## 2023-10-26 NOTE — OCCUPATIONAL THERAPY INITIAL EVALUATION ADULT - GENERAL OBSERVATIONS, REHAB EVAL
OT IE complete. MAHIN Zaragoza clearing pt. for OOB. Pt. received semi-supine in bed, +heplock, +primafit in NAD agreeable to therapy session

## 2023-10-26 NOTE — OCCUPATIONAL THERAPY INITIAL EVALUATION ADULT - PERTINENT HX OF CURRENT PROBLEM, REHAB EVAL
Ms. Redmond is an 77 y/o F with a PMHx of Rheumatoid Arthritis (on Leflunomide), hx of Non-Hodgkin's Lymphoma in 2014 (s/p chemo in remission, s/p thoracic spine surgery from [place]), hypothyroidism, and HLD presenting for fall this afternoon 10/25. Pt was in her usual state of health on the sidewalk of her apartment on [address] when she misplaced her step and fell onto the sidewalk. Pt denies head trauma, hand trauma, states she fell directly on the L region of her pelvis,    X-Ray: Acute displaced left superior and inferior pubic rami fractures. No other fractures. No advanced arthritic changes

## 2023-10-26 NOTE — OCCUPATIONAL THERAPY INITIAL EVALUATION ADULT - ADDITIONAL COMMENTS
[place]. [place]. It is a two story home with a half BR on 1st floor with 2STE. Pt. reports independence prior in ADLs and functional mob/transfers without AD. BR with walk in shower and grab bars Pt. [place] and [place]. [place] home after. It is a two story home with a half BR on 1st floor with 2 AUNG. Pt. reports independence prior in ADLs and functional mob/transfers without AD. BR with walk in shower and grab bars

## 2023-10-26 NOTE — PROGRESS NOTE ADULT - PROBLEM SELECTOR PLAN 4
Pt takes Leflunomide (Arava) 20mg QD for Rheumatoid Arthritis. Follows Dr. Christian Vivar at [facility] for hx of Non-Hodgkin's Lymphoma in remission.     Plan:  - need to bring in leflunomide from home, unavailable in hospital pharmacy

## 2023-10-26 NOTE — CONSULT NOTE ADULT - SUBJECTIVE AND OBJECTIVE BOX
Orthopaedic Surgery Consult Note    For Surgeon: Zaki    HPI:  78yFemale  Patient is a 78y old  Female who presents with a chief complaint of inability to ambulate s/p mechanical fall this morning. Of note, denies head strike/LOC. Otherwise, NVI.     HPI:  Ms. Redmond is an 77 y/o F with a PMHx of Rheumatoid Arthritis (on Leflunomide), hx of Non-Hodgkin's Lymphoma in 2014 (s/p chemo in remission, s/p thoracic spine surgery from NHL), hypothyroidism, and HLD presenting for fall this afternoon 10/25. Pt was in her usual state of health on the sidewalk of her apartment on [facility] when she misplaced her step and fell onto the sidewalk. Pt denies head trauma, hand trauma, states she fell directly on the L region of her pelvis, and denies LOC, dizziness, preceding palpitations, blurry vision. Pt was helped to a seated position by bystanders, was told she had questionable syncopal episode lasting several seconds but pt herself has no recollection of such events.     On ROS, pt denies fevers, chills, CP, SOB, abdominal pain, dysuria, hematuria, melena, hematochezia. States she has chronic back pain from a surgery in 2014 where she had "rods placed" in the setting of her Non-Hodgkin's Lymphoma. Endorses pain in her hip only when flexing/extending at the hip. No bleeding. No pain otherwise. Pt states she is not able to walk.     ED Course:  ED Vitals: T 97.6, HR 74, /71, satting 99% on RA   Pertinent labs: WBC 7.28, Hgb/Hct of 10.7/33.1, Platelets 215; Na 137, K 3.8, Cl 100, CO2 29, BUN/Cr 17/0.81, Glucose 139  Pt given Ibuprofen 400mg 1x  Hip X-Ray: Acute displaced leftsuperior and inferior pubic rami fractures. No other fractures. No advanced arthritic changes.  Admitted to [facility] for weakness in the setting of confirmed pelvic fracture  (25 Oct 2023 22:48)      Allergies    No Known Allergies    Intolerances      PAST MEDICAL & SURGICAL HISTORY:  Hypothyroidism        MEDICATIONS  (STANDING):  influenza  Vaccine (HIGH DOSE) 0.7 milliLiter(s) IntraMuscular once  lidocaine   4% Patch 1 Patch Transdermal daily    MEDICATIONS  (PRN):  acetaminophen     Tablet .. 650 milliGRAM(s) Oral every 6 hours PRN Mild Pain (1 - 3)      Vital Signs Last 24 Hrs  T(C): 36.3 (25 Oct 2023 22:59), Max: 36.8 (25 Oct 2023 21:40)  T(F): 97.4 (25 Oct 2023 22:59), Max: 98.2 (25 Oct 2023 21:40)  HR: 75 (25 Oct 2023 22:59) (74 - 85)  BP: 143/72 (25 Oct 2023 22:59) (139/71 - 148/74)  BP(mean): --  RR: 18 (25 Oct 2023 22:59) (17 - 18)  SpO2: 93% (25 Oct 2023 22:59) (93% - 99%)    Parameters below as of 25 Oct 2023 22:59  Patient On (Oxygen Delivery Method): room air        Physical Exam:  VS: stable, reviewed per nursing documentation  General: No acute distress, resting comfortably  Neuro: Alert, oriented x 3  Psych: Normal mood & affect  HEENT: normocephalic, atraumatic   Neck: trachea midline  Respiratory: nonlabored on room air   CV: no cyanosis, no peripheral edema   GI: nontender, nondistended   Skin: Warm, dry, no rash, no lesions, no abrasions no ecchymosis   MSK: atraumatic with exception of below  RLE LLE     -Inspection/palpation: No significant tenderness around the pelvis/hip. No bruises/ecchymosis noted    -Compartments: soft, nontender bilaterally     -ROM: limited APROM of the hip due to pain    -Motor:5/5 TA/GS/EHL/FHL 5/5 bilateral lower extremities     -Sensation:  intact to light touch bilateral lower extremities    -Pulses: 2+ DP/PT pulses bilaterally, symmetric, extremities warm and    well perfused, capillary refill brisk                            10.7   7.28  )-----------( 215      ( 25 Oct 2023 19:08 )             33.1     10-25    137  |  100  |  17  ----------------------------<  139<H>  3.8   |  29  |  0.81    Ca    9.0      25 Oct 2023 19:08  Mg     1.7     10-25        Imaging:   XR showing acute displaced pubic rami fractures    A/P: 78yFemale with superior and inferior pubic rami fracture s/p mechanical fall  -Pain control  - Recommending PT; WBAT with assistive device  -Fu with Dr. Haines OUtpt    -Pending discussion with Dr. Haines    Ortho Pager [phone]  
  Patient is a 78y old  Female who presents with a chief complaint of     HPI:  Ms. Redmond is an 77 y/o F with a PMHx of Rheumatoid Arthritis (on Leflunomide), hx of Non-Hodgkin's Lymphoma in 2014 (s/p chemo in remission, s/p thoracic spine surgery from [place]), hypothyroidism, and HLD presenting for fall this afternoon 10/25. Pt was in her usual state of health on the sidewalk of her apartment on [facility] when she misplaced her step and fell onto the sidewalk. Pt denies head trauma, hand trauma, states she fell directly on the L region of her pelvis, and denies LOC, dizziness, preceding palpitations, blurry vision. Pt was helped to a seated position by bystanders, was told she had questionable syncopal episode lasting several seconds but pt herself has no recollection of such events.     On ROS, pt denies fevers, chills, CP, SOB, abdominal pain, dysuria, hematuria, melena, hematochezia. States she has chronic back pain from a surgery in 2014 where she had "rods placed" in the setting of her Non-Hodgkin's Lymphoma. Endorses pain in her hip only when flexing/extending at the hip. No bleeding. No pain otherwise. Pt states she is not able to walk.     ED Course:  ED Vitals: T 97.6, HR 74, /71, satting 99% on RA   Pertinent labs: WBC 7.28, Hgb/Hct of 10.7/33.1, Platelets 215; Na 137, K 3.8, Cl 100, CO2 29, BUN/Cr 17/0.81, Glucose 139  Pt given Ibuprofen 400mg 1x  Hip X-Ray: Acute displaced leftsuperior and inferior pubic rami fractures. No other fractures. No advanced arthritic changes.  Admitted to [facility] for weakness in the setting of confirmed pelvic fracture  (25 Oct 2023 22:48)    PAST MEDICAL & SURGICAL HISTORY:  Hypothyroidism        MEDICATIONS  (STANDING):  atorvastatin 10 milliGRAM(s) Oral at bedtime  enoxaparin Injectable 40 milliGRAM(s) SubCutaneous every 24 hours  influenza  Vaccine (HIGH DOSE) 0.7 milliLiter(s) IntraMuscular once  levothyroxine 50 MICROGram(s) Oral daily  lidocaine   4% Patch 1 Patch Transdermal daily  pantoprazole    Tablet 40 milliGRAM(s) Oral before breakfast    MEDICATIONS  (PRN):  acetaminophen     Tablet .. 650 milliGRAM(s) Oral every 6 hours PRN Mild Pain (1 - 3)  ketorolac   Injectable 15 milliGRAM(s) IV Push every 4 hours PRN Moderate Pain (4 - 6)  oxyCODONE    IR 5 milliGRAM(s) Oral every 6 hours PRN Severe Pain (7 - 10)            FAMILY HISTORY:      CBC Full  -  ( 26 Oct 2023 05:30 )  WBC Count : 5.43 K/uL  RBC Count : 3.13 M/uL  Hemoglobin : 10.2 g/dL  Hematocrit : 31.1 %  Platelet Count - Automated : 200 K/uL  Mean Cell Volume : 99.4 fl  Mean Cell Hemoglobin : 32.6 pg  Mean Cell Hemoglobin Concentration : 32.8 gm/dL  Auto Neutrophil # : 3.45 K/uL  Auto Lymphocyte # : 0.54 K/uL  Auto Monocyte # : 0.79 K/uL  Auto Eosinophil # : 0.45 K/uL  Auto Basophil # : 0.10 K/uL  Auto Neutrophil % : 63.6 %  Auto Lymphocyte % : 10.0 %  Auto Monocyte % : 14.6 %  Auto Eosinophil % : 8.2 %  Auto Basophil % : 1.8 %      10-25    137  |  100  |  17  ----------------------------<  139<H>  3.8   |  29  |  0.81    Ca    9.0      25 Oct 2023 19:08  Mg     1.7     10-25        Urinalysis Basic - ( 25 Oct 2023 19:08 )    Color: x / Appearance: x / SG: x / pH: x  Gluc: 139 mg/dL / Ketone: x  / Bili: x / Urobili: x   Blood: x / Protein: x / Nitrite: x   Leuk Esterase: x / RBC: x / WBC x   Sq Epi: x / Non Sq Epi: x / Bacteria: x        Radiology :     < from: Xray Hip 2-3 Views, Left (10.25.23 @ 18:08) >  ACC: [id number] EXAM:  XR HIP 2-3V LT   ORDERED BY: CARLOS RODRIGUEZ     PROCEDURE DATE:  10/25/2023          INTERPRETATION:  EXAMINATION: AP pelvis and 2 views of the left hip    CLINICAL INFORMATION: Left hip pain    IMPRESSION:    Acute displaced left superior and inferior pubic rami fractures. No other   fractures. No advanced arthritic changes.           Review of Systems : per HPI         Vital Signs Last 24 Hrs  T(C): 37.1 (26 Oct 2023 05:28), Max: 37.1 (26 Oct 2023 05:28)  T(F): 98.8 (26 Oct 2023 05:28), Max: 98.8 (26 Oct 2023 05:28)  HR: 63 (26 Oct 2023 05:28) (63 - 85)  BP: 162/65 (26 Oct 2023 05:28) (120/68 - 162/65)  BP(mean): --  RR: 18 (26 Oct 2023 05:28) (17 - 18)  SpO2: 93% (26 Oct 2023 05:28) (93% - 99%)    Parameters below as of 26 Oct 2023 05:28  Patient On (Oxygen Delivery Method): nasal cannula  O2 Flow (L/min): 3          Physical Exam :   78 y o woman lying comfortably in semi Block's position , awake , alert , no acute complaints      Head : normocephalic , atraumatic    Eyes : PERRLA , EOMI , no nystagmus , sclera anicteric    ENT / FACE : neg nasal discharge , uvula midline , no oropharyngeal erythema / exudate    Neck : supple , negative JVD , negative carotid bruits , no thyromegaly    Chest : CTA bilaterally , neg wheeze / rhonchi / rales / crackles / egophany    Cardiovascular : regular rate and rhythm , neg murmurs / rubs / gallops    Abdomen : soft , non distended , non tender to palpation in all 4 quadrants ,  normal bowel sounds     Extremities : WWP , neg cyanosis /clubbing / edema     Musculoskeletal :  pain with Left hip  flexion > 40 deg/ E.R.      Skin :        :     Neurologic Exam :     Alert and oriented to person , place , date/year , speech fluent w/o dysarthria     Cranial Nerves:           II :                         pupils equal , round and reactive to light , visual fields intact         III/ IV/VI :              extraocular movements intact , neg nystagmus , neg ptosis        V :                        facial sensation intact , V1-3 normal        VII :                      face symmetric , no droop , normal eye closure and smile        VIII :                     hearing intact to finger rub bilaterally        IX and X :             no hoarseness , gag intact , palate/ uvula rise symmetrically        XI :                       SCM / trapezius strength intact bilateral        XII :                      no tongue deviation       Motor Exam:                Right UE:                     no focal weakness ,  > 3+/5 throughout      Left UE:                       no focal weakness ,  > 3+/5 throughout           Right LE:          no focal weakness ,  > 3+/5 throughout          Left LE:  pain limited proximally          no focal weakness ,  > 3+/5 throughout           Sensation :         intact to light touch x 4 extremities                            no neglect or extinction on double simultaneous testing.    DTR :           biceps/brachioradialis : equal                            patella/ankle : equal           neg Babinski       Coordination :            Finger to Nose :  neg dysmetria bilaterally        Gait :  not tested          PM&R Impression : admitted for c/o fall , + Left pubic ramus fx / WBAT           Recommendations / Plan :       1) Physical / Occupational therapy focusing on therapeutic exercises , equipment evaluation , bed mobility/transfer out of bed evaluation , progressive ambulation with assistive devices prn .    2) Current disposition plan recommendation  :     probable subacute rehab placement

## 2023-10-26 NOTE — CONSULT NOTE ADULT - ASSESSMENT
{\rtf1\qhtwlr99545\ansi\yqweadj5782\ftnbj\uc1\deff0  {\fonttbl{\f0 \fnil Segoe UI;}{\f1 \fnil \fcharset0 Segoe UI;}{\f2 \fnil Times New Taz;}}  {\colortbl ;\xsc616\ipzod601\cjsw813 ;\red0\green0\blue0 ;\red0\green0\rroe705 ;\red0\green0\blue0 ;}  {\stylesheet{\f0\fs20 Normal;}{\cs1 Default Paragraph Font;}{\cs2\f0\fs16 Line Number;}{\cs3\f2\fs24\ul\cf3 Hyperlink;}}  {\*\revtbl{Unknown;}}  \blvyhq22791\tqnfwd86840\hwdco1205\buxuc8275\chhjh7377\fenvo7932\jgpdneo839\ejvmsfh098\nogrowautofit\oawzdn384\formshade\nofeaturethrottle1\dntblnsbdb\fet4\aendnotes\aftnnrlc\pgbrdrhead\pgbrdrfoot  \sectd\flmbyo21541\eutfhm67158\guttersxn0\umbrnvkc4591\qgiexeim7014\jaelrqpo2903\eqdfbohb6343\udwwfgs960\jkgpudn492\sbkpage\pgncont\pgndec  \plain\plain\f0\fs24\ql\plain\f0\fs24\plain\f0\fs20\dgxo5231\hich\f0\dbch\f0\loch\f0\fs20\par  I M\par  \par   78 y o F with a PMHx of Rheumatoid Arthritis (on Leflunomide), hx of Non-Hodgkin's Lymphoma in 2014 (s/p chemo in remission, s/p thoracic spine surgery from [place]), hypothyroidism, and HLD presenting for fall this afternoon 10/25. \par  \par  \plain\f1\fs20\ebdc3242\hich\f1\dbch\f1\loch\f1\cf2\fs20\ul{\field{\*\fldinst HYPERLINK [id number] }{\fldrslt Problem/Plan - 1:}}\plain\f0\fs20\cfko9429\hich\f0\dbch\f0\loch\f0\fs20\ql\par  \'b7  {\*\bkmkstart [id number]}{\*\bkmkend tq70792444432}Problem: {\*\bkmkstart hi81155389793}{\*\bkmkend lo40025121462}Fall on or from sidewalk curb, initial encounter. \par  \'b7  {\*\bkmkstart gm57886716077}{\*\bkmkend lh17208253268}Plan: {\*\bkmkstart up46579050514}{\*\bkmkend ar58788027063}Pt reports misplacing her step on curb this afternoon 10/25. Pt with decreased strength in b/l lower extremities. Reports no pain unless   she moves her hips (flexion/extension). EKG showed no NSR with normal axis, no AUNG/STD. \par  Plan:\par  - Tylenol 650mg Q4h PRN for mild pain\par  - Toradol 10mg IVP Q4h PRN for moderate pain\par  - Oxycodone 5mg PO Q6h PRN for severe pain \par  - Lidocaine patches\par  - Fall precautions\par  - PT/OT consult in AM.\plain\f1\fs20\mayz3542\hich\f1\dbch\f1\loch\f1\cf2\fs20\strike\plain\f0\fs20\bmyy3951\hich\f0\dbch\f0\loch\f0\fs20\par  \par  \plain\f1\fs20\etou5459\hich\f1\dbch\f1\loch\f1\cf2\fs20\ul{\field{\*\fldinst HYPERLINK [id number] }{\fldrslt Problem/Plan - 2:}}\plain\f0\fs20\wiqo8296\hich\f0\dbch\f0\loch\f0\fs20\ql\par  \'b7  {\*\bkmkstart sg82210374089}{\*\bkmkend nk81203939182}Problem: {\*\bkmkstart dk87908767467}{\*\bkmkend ba18875557900}Pubic ramus fracture. \par  \'b7  {\*\bkmkstart nz78103365175}{\*\bkmkend qa76204303444}Plan: {\*\bkmkstart ad26672600496}{\*\bkmkend ml97223120675}Hip X-Ray 10/25: Acute displaced left superior and inferior pubic rami fractures. No other fractures. No advanced arthritic changes. \par  Plan:\par  - Ortho consulted, appreciate recs\par  - Pain control as above\par  - PT/OT consult in AM.\plain\f1\fs20\qnwn5116\hich\f1\dbch\f1\loch\f1\cf2\fs20\strike\plain\f0\fs20\pjnl7953\hich\f0\dbch\f0\loch\f0\fs20\par  \par  \plain\f1\fs20\hrts0441\hich\f1\dbch\f1\loch\f1\cf2\fs20\ul{\field{\*\fldinst HYPERLINK [id number] }{\fldrslt Problem/Plan - 3:}}\plain\f0\fs20\rrhi9227\hich\f0\dbch\f0\loch\f0\fs20\ql\par  \'b7  {\*\bkmkstart jc55466181190}{\*\bkmkend mu31520180661}Problem: {\*\bkmkstart bg34403496454}{\*\bkmkend ml69095830994}Macrocytic anemia.\plain\f1\fs20\apva3143\hich\f1\dbch\f1\loch\f1\cf2\fs20\strike\plain\f0\fs20\oejw4805\hich\f0\dbch\f0\loch\f0\fs20\par  \'b7  {\*\bkmkstart gf30524825638}{\*\bkmkend kk86781422479}Plan:\plain\f1\fs20\bqaq0538\hich\f1\dbch\f1\loch\f1\cf2\fs20\strike\plain\f0\fs20\qkgk8896\hich\f0\dbch\f0\loch\f0\fs20  {\*\bkmkstart cj59874344816}{\*\bkmkend rc83854979551}Pt with admission   Hgb/Hct of 10.7/33.1. MCV of 100.6. Pt states she has a hx of MAYCOL, has monthly iron shots. \par  Plan:\par  - F/u with iron studies in AM, B12/Folate levels\par  - Transfusion goal > 7\par  - Maintain 2 active T&S{\*\bkmkstart bkcommentCR}{\*\bkmkend bkcommentCR}.\par  \par  \plain\f1\fs20\gqoo9161\hich\f1\dbch\f1\loch\f1\cf2\fs20\ul{\field{\*\fldinst HYPERLINK [id number] }{\fldrslt Problem/Plan - 4:}}\plain\f0\fs20\bmoo9735\hich\f0\dbch\f0\loch\f0\fs20\ql\par  \'b7  {\*\bkmkstart rt35027853147}{\*\bkmkend dr62411119842}Problem: {\*\bkmkstart xw09900281763}{\*\bkmkend pq51371052631}Rheumatoid arthritis. \par  \'b7  {\*\bkmkstart si76146357183}{\*\bkmkend ni06146870986}Plan: {\*\bkmkstart ku33923041805}{\*\bkmkend yr19225430124}Pt takes Leflunomide (Arava) QD for Rheumatoid Arthritis. Follows Dr. Christian Vivar at [facility] for hx of Non-Hodgkin's Lymphoma in remission. \par  Plan:\par  - Heme/Onc consult in AM for continuation of Leflunomide.\par  \par  \plain\f1\fs20\quvi7432\hich\f1\dbch\f1\loch\f1\cf2\fs20\ul{\field{\*\fldinst HYPERLINK [id number] }{\fldrslt Problem/Plan - 5:}}\plain\f0\fs20\ueja7455\hich\f0\dbch\f0\loch\f0\fs20\ql\par  \'b7  {\*\bkmkstart di23070256182}{\*\bkmkend lq64611466934}Problem: {\*\bkmkstart zc61431560776}{\*\bkmkend rf53949186047}Hypothyroidism. \par  \'b7  {\*\bkmkstart bk59070934455}{\*\bkmkend db58002709628}Plan: {\*\bkmkstart ll99648078106}{\*\bkmkend fk62418170588}Pt takes Synthroid 50mcg QD \par  Plan:\par  - C/w Synthroid 50mcg in AM.\plain\f1\fs20\vjyu4342\hich\f1\dbch\f1\loch\f1\cf2\fs20\strike\plain\f0\fs20\nxij1635\hich\f0\dbch\f0\loch\f0\fs20\par  \par  \plain\f1\fs20\qmhi8811\hich\f1\dbch\f1\loch\f1\cf2\fs20\ul{\field{\*\fldinst HYPERLINK [id number] }{\fldrslt Problem/Plan - 6:}}\plain\f0\fs20\xqks4132\hich\f0\dbch\f0\loch\f0\fs20\ql\par  \'b7  {\*\bkmkstart sg18571105754}{\*\bkmkend li39052437357}Problem: {\*\bkmkstart fk49007220131}{\*\bkmkend nj50091069369}HLD (hyperlipidemia). \par  \'b7  {\*\bkmkstart vk79063591547}{\*\bkmkend hz72888135538}Plan: {\*\bkmkstart yj61021652346}{\*\bkmkend fx54091355594}Pt takes Atorvastatin 10mg QD\par  Plan:\par  - C/w home medications.\par  \par  \plain\f1\fs20\ifle1434\hich\f1\dbch\f1\loch\f1\cf2\fs20\ul{\field{\*\fldinst HYPERLINK [id number] }{\fldrslt Problem/Plan - 7:}}\plain\f0\fs20\qejn6468\hich\f0\dbch\f0\loch\f0\fs20\ql\par  \'b7  {\*\bkmkstart sr77369183614}{\*\bkmkend pp46119760312}Problem: {\*\bkmkstart wb84497838090}{\*\bkmkend fe68818021668}Prophylactic measure. \par  \'b7  {\*\bkmkstart kn54341710775}{\*\bkmkend tu39054179349}Plan: {\*\bkmkstart hb23359908594}{\*\bkmkend ex90486476140}F: Tolerating PO\par  E: Replete PRN K>3.5, Mg>1.6\par  GI: None\par  Diet: Regular\par  DVT: SCDs \par  Dispo: RMF.\par  \par  \par  }

## 2023-10-26 NOTE — PROGRESS NOTE ADULT - PROBLEM SELECTOR PLAN 7
F: Tolerating PO  E: Replete PRN K>3.5, Mg>1.6  GI: Pantoprazole  Diet: Regular  DVT: Lovenox  Dispo: RMF. F: Tolerating PO  E: Replete PRN K>3.5, Mg>1.6  GI: Pantoprazole 40 mg QD  Diet: Regular  DVT: Lovenox  Dispo: RMF.

## 2023-10-26 NOTE — OCCUPATIONAL THERAPY INITIAL EVALUATION ADULT - DIAGNOSIS, OT EVAL
Pt. admittted to [facility] s/p mechanical fall, dx of L acute displaced left superior/inferior pubic rami fxs. Upon assessment, pt. demo increased pain impacting balance and ability to safely engage in ADLs and functional mob/transfers

## 2023-10-27 ENCOUNTER — TRANSCRIPTION ENCOUNTER (OUTPATIENT)
Age: 78
End: 2023-10-27

## 2023-10-27 LAB
ALBUMIN SERPL ELPH-MCNC: 3.3 G/DL — SIGNIFICANT CHANGE UP (ref 3.3–5)
ALBUMIN SERPL ELPH-MCNC: 3.3 G/DL — SIGNIFICANT CHANGE UP (ref 3.3–5)
ALP SERPL-CCNC: 84 U/L — SIGNIFICANT CHANGE UP (ref 40–120)
ALP SERPL-CCNC: 84 U/L — SIGNIFICANT CHANGE UP (ref 40–120)
ALT FLD-CCNC: 15 U/L — SIGNIFICANT CHANGE UP (ref 10–45)
ALT FLD-CCNC: 15 U/L — SIGNIFICANT CHANGE UP (ref 10–45)
ANION GAP SERPL CALC-SCNC: 9 MMOL/L — SIGNIFICANT CHANGE UP (ref 5–17)
ANION GAP SERPL CALC-SCNC: 9 MMOL/L — SIGNIFICANT CHANGE UP (ref 5–17)
AST SERPL-CCNC: 20 U/L — SIGNIFICANT CHANGE UP (ref 10–40)
AST SERPL-CCNC: 20 U/L — SIGNIFICANT CHANGE UP (ref 10–40)
BILIRUB SERPL-MCNC: 0.5 MG/DL — SIGNIFICANT CHANGE UP (ref 0.2–1.2)
BILIRUB SERPL-MCNC: 0.5 MG/DL — SIGNIFICANT CHANGE UP (ref 0.2–1.2)
BUN SERPL-MCNC: 11 MG/DL — SIGNIFICANT CHANGE UP (ref 7–23)
BUN SERPL-MCNC: 11 MG/DL — SIGNIFICANT CHANGE UP (ref 7–23)
CALCIUM SERPL-MCNC: 8.4 MG/DL — SIGNIFICANT CHANGE UP (ref 8.4–10.5)
CALCIUM SERPL-MCNC: 8.4 MG/DL — SIGNIFICANT CHANGE UP (ref 8.4–10.5)
CHLORIDE SERPL-SCNC: 103 MMOL/L — SIGNIFICANT CHANGE UP (ref 96–108)
CHLORIDE SERPL-SCNC: 103 MMOL/L — SIGNIFICANT CHANGE UP (ref 96–108)
CO2 SERPL-SCNC: 27 MMOL/L — SIGNIFICANT CHANGE UP (ref 22–31)
CO2 SERPL-SCNC: 27 MMOL/L — SIGNIFICANT CHANGE UP (ref 22–31)
CREAT SERPL-MCNC: 0.7 MG/DL — SIGNIFICANT CHANGE UP (ref 0.5–1.3)
CREAT SERPL-MCNC: 0.7 MG/DL — SIGNIFICANT CHANGE UP (ref 0.5–1.3)
EGFR: 87 ML/MIN/1.73M2 — SIGNIFICANT CHANGE UP
EGFR: 87 ML/MIN/1.73M2 — SIGNIFICANT CHANGE UP
GLUCOSE SERPL-MCNC: 91 MG/DL — SIGNIFICANT CHANGE UP (ref 70–99)
GLUCOSE SERPL-MCNC: 91 MG/DL — SIGNIFICANT CHANGE UP (ref 70–99)
HCT VFR BLD CALC: 31.1 % — LOW (ref 34.5–45)
HCT VFR BLD CALC: 31.1 % — LOW (ref 34.5–45)
HGB BLD-MCNC: 10.2 G/DL — LOW (ref 11.5–15.5)
HGB BLD-MCNC: 10.2 G/DL — LOW (ref 11.5–15.5)
MAGNESIUM SERPL-MCNC: 2 MG/DL — SIGNIFICANT CHANGE UP (ref 1.6–2.6)
MAGNESIUM SERPL-MCNC: 2 MG/DL — SIGNIFICANT CHANGE UP (ref 1.6–2.6)
MCHC RBC-ENTMCNC: 32.8 GM/DL — SIGNIFICANT CHANGE UP (ref 32–36)
MCHC RBC-ENTMCNC: 32.8 GM/DL — SIGNIFICANT CHANGE UP (ref 32–36)
MCHC RBC-ENTMCNC: 33.3 PG — SIGNIFICANT CHANGE UP (ref 27–34)
MCHC RBC-ENTMCNC: 33.3 PG — SIGNIFICANT CHANGE UP (ref 27–34)
MCV RBC AUTO: 101.6 FL — HIGH (ref 80–100)
MCV RBC AUTO: 101.6 FL — HIGH (ref 80–100)
NRBC # BLD: 0 /100 WBCS — SIGNIFICANT CHANGE UP (ref 0–0)
NRBC # BLD: 0 /100 WBCS — SIGNIFICANT CHANGE UP (ref 0–0)
PHOSPHATE SERPL-MCNC: 2.4 MG/DL — LOW (ref 2.5–4.5)
PHOSPHATE SERPL-MCNC: 2.4 MG/DL — LOW (ref 2.5–4.5)
PLATELET # BLD AUTO: 208 K/UL — SIGNIFICANT CHANGE UP (ref 150–400)
PLATELET # BLD AUTO: 208 K/UL — SIGNIFICANT CHANGE UP (ref 150–400)
POTASSIUM SERPL-MCNC: 3.9 MMOL/L — SIGNIFICANT CHANGE UP (ref 3.5–5.3)
POTASSIUM SERPL-MCNC: 3.9 MMOL/L — SIGNIFICANT CHANGE UP (ref 3.5–5.3)
POTASSIUM SERPL-SCNC: 3.9 MMOL/L — SIGNIFICANT CHANGE UP (ref 3.5–5.3)
POTASSIUM SERPL-SCNC: 3.9 MMOL/L — SIGNIFICANT CHANGE UP (ref 3.5–5.3)
PROT SERPL-MCNC: 5.7 G/DL — LOW (ref 6–8.3)
PROT SERPL-MCNC: 5.7 G/DL — LOW (ref 6–8.3)
RBC # BLD: 3.06 M/UL — LOW (ref 3.8–5.2)
RBC # BLD: 3.06 M/UL — LOW (ref 3.8–5.2)
RBC # FLD: 16.1 % — HIGH (ref 10.3–14.5)
RBC # FLD: 16.1 % — HIGH (ref 10.3–14.5)
SODIUM SERPL-SCNC: 139 MMOL/L — SIGNIFICANT CHANGE UP (ref 135–145)
SODIUM SERPL-SCNC: 139 MMOL/L — SIGNIFICANT CHANGE UP (ref 135–145)
WBC # BLD: 5.39 K/UL — SIGNIFICANT CHANGE UP (ref 3.8–10.5)
WBC # BLD: 5.39 K/UL — SIGNIFICANT CHANGE UP (ref 3.8–10.5)
WBC # FLD AUTO: 5.39 K/UL — SIGNIFICANT CHANGE UP (ref 3.8–10.5)
WBC # FLD AUTO: 5.39 K/UL — SIGNIFICANT CHANGE UP (ref 3.8–10.5)

## 2023-10-27 PROCEDURE — 99233 SBSQ HOSP IP/OBS HIGH 50: CPT | Mod: GC

## 2023-10-27 RX ORDER — POLYETHYLENE GLYCOL 3350 17 G/17G
17 POWDER, FOR SOLUTION ORAL
Refills: 0 | Status: DISCONTINUED | OUTPATIENT
Start: 2023-10-27 | End: 2023-10-28

## 2023-10-27 RX ADMIN — PANTOPRAZOLE SODIUM 40 MILLIGRAM(S): 20 TABLET, DELAYED RELEASE ORAL at 08:16

## 2023-10-27 RX ADMIN — LIDOCAINE 2 PATCH: 4 CREAM TOPICAL at 19:17

## 2023-10-27 RX ADMIN — Medication 15 MILLIGRAM(S): at 18:07

## 2023-10-27 RX ADMIN — ENOXAPARIN SODIUM 40 MILLIGRAM(S): 100 INJECTION SUBCUTANEOUS at 22:32

## 2023-10-27 RX ADMIN — Medication 15 MILLIGRAM(S): at 17:52

## 2023-10-27 RX ADMIN — ATORVASTATIN CALCIUM 10 MILLIGRAM(S): 80 TABLET, FILM COATED ORAL at 22:31

## 2023-10-27 RX ADMIN — LIDOCAINE 2 PATCH: 4 CREAM TOPICAL at 11:59

## 2023-10-27 RX ADMIN — LIDOCAINE 2 PATCH: 4 CREAM TOPICAL at 22:43

## 2023-10-27 RX ADMIN — POLYETHYLENE GLYCOL 3350 17 GRAM(S): 17 POWDER, FOR SOLUTION ORAL at 11:57

## 2023-10-27 RX ADMIN — Medication 15 MILLIGRAM(S): at 04:30

## 2023-10-27 RX ADMIN — Medication 15 MILLIGRAM(S): at 11:59

## 2023-10-27 RX ADMIN — Medication 15 MILLIGRAM(S): at 22:41

## 2023-10-27 RX ADMIN — Medication 15 MILLIGRAM(S): at 23:34

## 2023-10-27 RX ADMIN — Medication 15 MILLIGRAM(S): at 12:14

## 2023-10-27 RX ADMIN — Medication 50 MICROGRAM(S): at 05:39

## 2023-10-27 RX ADMIN — POLYETHYLENE GLYCOL 3350 17 GRAM(S): 17 POWDER, FOR SOLUTION ORAL at 17:52

## 2023-10-27 NOTE — PROGRESS NOTE ADULT - PROBLEM SELECTOR PROBLEM 5
Outpatient Progress Note    Chief Complaint:   Chief Complaint   Patient presents with   • Office Visit     see 12/12 encounter        Narrative summary:   Iveth is a 55 year old female who is seen for palpitations. She is currently under the care of Adrian Allison MD. Reports past medical history of hyperlipidemia, anxiety, prediabetes, vitamin D deficiency, hepatic steatosis, allergic rhinitis, and chronic neck pain. Complains of palpitations. Onset Saturday (12/11/21) at 1030 AM. States suddenly felt epigastric bloating, followed by palpitations, apple watch showed tachycardia w/ HR >100 BPM, bilateral hands white and numb, and felt flushed. Took Tylenol 500 mg, sat down, and performed deep breathing exercises. Only lasted 10 min. Symptoms resolved afterwards and hand colored returned. No dizziness, chest pain, SOB, headache, or syncope. Relates to increased stress.  family in [facility], COVID-19 worsened. Only has  here. All 5 children and grandchildren live in [facility]. Son-in-law also passed away on 11/2/21 d/t suicide. Ongoing intermittent insomnia. Had similar epigastric bloating 3 weeks ago. Took a mohini seltzer and old omeprazole, burped, and symptoms resolved. Hx of panic attack 1 year ago. Has ongoing intermtitent left elbow pain. No other related complaints. See ROS for all pertinent negatives.    PAST MEDICAL HISTORY:   History reviewed. No pertinent past medical history.  Past Surgical History:   Procedure Laterality Date   • Colonoscopy w biopsy  08/07/2017    Tubular adenoma polyp - next exam due in 5 years - Dr Wan     MEDICATIONS:   Current Outpatient Medications   Medication Sig Dispense Refill   • fenofibrate 160 MG tablet Take 1 tablet by mouth daily. 90 tablet 3   • traZODone (DESYREL) 50 MG tablet Take 1 tablet by mouth nightly. 30 tablet 5   • clobetasol (TEMOVATE) 0.05 % ointment Apply topically 2 times daily. Apply to affected area 2 times daily for 1 week, then 
nightly for 1 week, then 3 times weekly thereafter. 30 g 1   • Cholecalciferol (VITAMIN D3) 2000 units Tab Take 1 tablet by mouth daily. 90 tablet 4   • loratadine (CLARITIN) 10 MG tablet Take 1 tablet by mouth daily. 90 tablet 3     No current facility-administered medications for this visit.     ALLERGIES:   ALLERGIES:  No Known Allergies     Review Of Systems:  Review of Systems   Constitutional: Negative for chills, fever and malaise/fatigue.   Eyes: Negative for blurred vision, double vision, vision loss in left eye and vision loss in right eye.   Cardiovascular: Positive for palpitations. Negative for chest pain, dyspnea on exertion, irregular heartbeat, leg swelling, near-syncope and syncope.   Respiratory: Negative for cough, shortness of breath, sputum production and wheezing.    Skin: Positive for color change and flushing. Negative for rash.   Gastrointestinal: Positive for bloating. Negative for abdominal pain, constipation, diarrhea, heartburn, nausea and vomiting.   Neurological: Positive for numbness. Negative for dizziness, focal weakness, headaches and paresthesias.   Psychiatric/Behavioral: Negative for depression, suicidal ideas and thoughts of violence. The patient has insomnia and is nervous/anxious.       OBJECTIVE:  Vital signs:   Visit Vitals  BP (!) 142/84 (BP Location: RUE - Right upper extremity, Patient Position: Sitting, Cuff Size: Regular)   Pulse 81   Ht 5' 1\" (1.549 m)   Wt 54 kg (119 lb)   LMP 11/10/2014   SpO2 98%   BMI 22.48 kg/m²      Exam:  Physical Exam  Vitals and nursing note reviewed.   Constitutional:       General: She is awake. She is not in acute distress.     Appearance: Normal appearance. She is well-developed, well-groomed and normal weight. She is not ill-appearing.      Comments: Elevated blood pressure   Neck:      Thyroid: No thyroid mass or thyromegaly.      Trachea: Trachea normal.   Cardiovascular:      Rate and Rhythm: Normal rate and regular rhythm.  No 
Hypothyroidism
extrasystoles are present.     Pulses: Normal pulses.      Heart sounds: Normal heart sounds, S1 normal and S2 normal. No murmur heard.   No systolic murmur is present.   No diastolic murmur is present.  No gallop. No S3 or S4 sounds.    Pulmonary:      Effort: Pulmonary effort is normal.      Breath sounds: Normal breath sounds. No decreased breath sounds, wheezing, rhonchi or rales.   Abdominal:      Tenderness: There is no right CVA tenderness or left CVA tenderness.   Musculoskeletal:      Cervical back: Neck supple.      Right lower leg: No edema.      Left lower leg: No edema.   Skin:     General: Skin is warm and moist.      Findings: No rash.   Neurological:      Mental Status: She is alert and oriented to person, place, and time.      Sensory: Sensation is intact. No sensory deficit.      Motor: Motor function is intact.      Coordination: Coordination is intact.      Gait: Gait is intact.   Psychiatric:         Attention and Perception: Attention and perception normal.         Mood and Affect: Affect normal. Mood is anxious.         Speech: Speech normal.         Behavior: Behavior is hyperactive. Behavior is cooperative.         Thought Content: Thought content is paranoid.         Cognition and Memory: Cognition and memory normal.         Judgment: Judgment normal.       LABS:  Lab Results   Component Value Date    WBC 6.7 06/18/2020    RBC 4.80 06/18/2020    HGB 13.7 06/18/2020    HCT 40.4 06/18/2020    MCV 84.2 06/18/2020    MCH 28.5 06/18/2020    MCHC 33.9 06/18/2020    RDWCV 12.5 06/18/2020    RDWSD 37.7 (L) 06/18/2020     06/18/2020     Lab Results   Component Value Date    SODIUM 141 01/27/2021    POTASSIUM 4.2 01/27/2021    CHLORIDE 102 01/27/2021    CO2 29 01/27/2021    ANIONGAP 14 01/27/2021    GLUCOSE 105 (H) 01/27/2021    BUN 13 01/27/2021    CREATININE 0.64 01/27/2021    GFRESTIMATE >90 01/27/2021    BCRAT 20 01/27/2021    CALCIUM 9.4 01/27/2021    BILIRUBIN 0.5 08/21/2019    AST 23 
01/27/2021    GPT 30 01/27/2021    ALKPT 46 08/21/2019    ALBUMIN 4.2 08/21/2019    TOTPROTEIN 7.9 08/21/2019    GLOB 3.7 08/21/2019    AGR 1.1 08/21/2019     Lab Results   Component Value Date    TSH 1.598 08/24/2018     ASSESSMENT/PLAN:  1. Palpitations - episode highly suspicious for an anxiety attack. Likely hyperventilating causing carpopedal syndrome (hand discoloration and numbness), flushing, and tachycardia, which all resolved with deep breathing exercises. VSS. Low suspicion for cardiac etiology. No concern for CVA. Possibly electrolyte imbalance, anemia, thyroid disorder, or arrhythmia. Epigastric bloating likely GERD, could be stress induced. Multiple stressors noted, including recent suicide of son-in-law, and all family living in [facility]. Has ongoing insomnia. No SI, HI, or hallucinations. Multiple other diagnoses considered. Ordered CBC, CMP, and TSH, will call with results. EKG showed sinus rhythm, unchanged from previous EKG. Discussed treatment options, declined medications or referral to behavioral health. Opted to work on nonpharmaceutical interventions. Advised to follow up with PCP in 4 weeks, sooner if worsening. Patient agreeable with plan.  -     CBC with Automated Differential  -     Comprehensive Metabolic Panel  -     Thyroid Stimulating Hormone Reflex  -     Electrocardiogram (ECG)  -     Electrocardiogram (ECG)  2. Gastroesophageal reflux disease, unspecified whether esophagitis present - see above.  3. Anxiety - see above.     No problem-specific Assessment & Plan notes found for this encounter.    Health Maintenance Due   Topic Date Due   • Shingles Vaccine (1 of 2) Never done   • Influenza Vaccine (1) 09/01/2021   • Depression Screening  02/04/2022     Follow up:   Return in about 4 weeks (around 1/11/2022), or if symptoms worsen or fail to improve.    Patient will be notified of results when they become available.    INDIA Dc  12/14/2021  3:45 PM  
Hypothyroidism

## 2023-10-27 NOTE — PROGRESS NOTE ADULT - SUBJECTIVE AND OBJECTIVE BOX
OVERNIGHT EVENTS: NIKOS.     SUBJECTIVE:  Patient seen and examined at bedside. AAOx3. Reports feeling well and denies pain. Denies fevers, chills, chest pain, sob, n/v, diarrhea, constipation, dysuria.    Vital Signs Last 12 Hrs  T(F): 98.8 (10-26-23 @ 05:28), Max: 98.8 (10-26-23 @ 05:28)  HR: 63 (10-26-23 @ 05:28) (63 - 78)  BP: 162/65 (10-26-23 @ 05:28) (120/68 - 162/65)  BP(mean): --  RR: 18 (10-26-23 @ 05:28) (17 - 18)  SpO2: 93% (10-26-23 @ 05:28) (93% - 95%)  I&O's Summary      PHYSICAL EXAM:  Constitutional: NAD, comfortable in bed.  HEENT: NC/AT, PERRLA, EOMI, no conjunctival pallor or scleral icterus, MMM  Neck: Supple, no JVD  Respiratory: CTA B/L. No w/r/r.   Cardiovascular: RRR, normal S1 and S2, no m/r/g.   Gastrointestinal: +BS, soft NTND, no guarding or rebound tenderness, no palpable masses   Extremities: wwp; no cyanosis, clubbing or edema. Limited active ROM of left hip secondary to pain.  Vascular: Pulses equal and strong throughout.   Neurological: AAOx3, no CN deficits. Full strength in b/l upper extremities. Sensation and strength intact throughout.   Skin: No gross skin abnormalities or rashes        LABS:                        10.2   5.43  )-----------( 200      ( 26 Oct 2023 05:30 )             31.1     10-26    137  |  102  |  11  ----------------------------<  89  3.7   |  27  |  0.63    Ca    8.7      26 Oct 2023 05:30  Phos  2.9     10-26  Mg     1.9     10-26        Urinalysis Basic - ( 26 Oct 2023 05:30 )    Color: x / Appearance: x / SG: x / pH: x  Gluc: 89 mg/dL / Ketone: x  / Bili: x / Urobili: x   Blood: x / Protein: x / Nitrite: x   Leuk Esterase: x / RBC: x / WBC x   Sq Epi: x / Non Sq Epi: x / Bacteria: x          RADIOLOGY & ADDITIONAL TESTS:    MEDICATIONS  (STANDING):  atorvastatin 10 milliGRAM(s) Oral at bedtime  enoxaparin Injectable 40 milliGRAM(s) SubCutaneous every 24 hours  influenza  Vaccine (HIGH DOSE) 0.7 milliLiter(s) IntraMuscular once  levothyroxine 50 MICROGram(s) Oral daily  lidocaine   4% Patch 1 Patch Transdermal daily  pantoprazole    Tablet 40 milliGRAM(s) Oral before breakfast    MEDICATIONS  (PRN):  acetaminophen     Tablet .. 650 milliGRAM(s) Oral every 6 hours PRN Mild Pain (1 - 3)  ketorolac   Injectable 15 milliGRAM(s) IV Push every 4 hours PRN Moderate Pain (4 - 6)  oxyCODONE    IR 5 milliGRAM(s) Oral every 6 hours PRN Severe Pain (7 - 10)  
OVERNIGHT EVENTS: NIKOS.     SUBJECTIVE:  Patient seen and examined at bedside. AAOx3. Reports feeling well, pain controlled. Wants Miralax given no BM since admission. Denies fevers, chills, chest pain, sob, n/v, difficulty urinating.    Vital Signs Last 12 Hrs  T(F): 97.4 (10-27-23 @ 05:43), Max: 97.4 (10-27-23 @ 05:43)  HR: 73 (10-27-23 @ 05:43) (73 - 73)  BP: 139/74 (10-27-23 @ 05:43) (139/74 - 139/74)  BP(mean): --  RR: 18 (10-27-23 @ 05:43) (18 - 18)  SpO2: 95% (10-27-23 @ 05:43) (95% - 95%)  I&O's Summary    26 Oct 2023 07:01  -  27 Oct 2023 07:00  --------------------------------------------------------  IN: 0 mL / OUT: 1300 mL / NET: -1300 mL        PHYSICAL EXAM:  Constitutional: NAD, comfortable in bed.  HEENT: NC/AT, PERRLA, EOMI, no conjunctival pallor or scleral icterus, MMM  Neck: Supple, no JVD  Respiratory: CTA B/L. No w/r/r.   Cardiovascular: RRR, normal S1 and S2, no m/r/g.   Gastrointestinal: +BS, soft NTND, no guarding or rebound tenderness, no palpable masses   Extremities: wwp; no cyanosis, clubbing or edema. Limited active ROM of left hip secondary to pain.  Vascular: Pulses equal and strong throughout.   Neurological: AAOx3, no CN deficits. Full strength in b/l upper extremities. Sensation and strength intact throughout.   Skin: No gross skin abnormalities or rashes          LABS:                        10.2   5.39  )-----------( 208      ( 27 Oct 2023 05:30 )             31.1     10-27    139  |  103  |  11  ----------------------------<  91  3.9   |  27  |  0.70    Ca    8.4      27 Oct 2023 05:30  Phos  2.4     10-27  Mg     2.0     10-27    TPro  5.7<L>  /  Alb  3.3  /  TBili  0.5  /  DBili  x   /  AST  20  /  ALT  15  /  AlkPhos  84  10-27      Urinalysis Basic - ( 27 Oct 2023 05:30 )    Color: x / Appearance: x / SG: x / pH: x  Gluc: 91 mg/dL / Ketone: x  / Bili: x / Urobili: x   Blood: x / Protein: x / Nitrite: x   Leuk Esterase: x / RBC: x / WBC x   Sq Epi: x / Non Sq Epi: x / Bacteria: x          RADIOLOGY & ADDITIONAL TESTS:    MEDICATIONS  (STANDING):  atorvastatin 10 milliGRAM(s) Oral at bedtime  enoxaparin Injectable 40 milliGRAM(s) SubCutaneous every 24 hours  influenza  Vaccine (HIGH DOSE) 0.7 milliLiter(s) IntraMuscular once  levothyroxine 50 MICROGram(s) Oral daily  lidocaine   4% Patch 2 Patch Transdermal daily  pantoprazole    Tablet 40 milliGRAM(s) Oral before breakfast  polyethylene glycol 3350 17 Gram(s) Oral two times a day    MEDICATIONS  (PRN):  acetaminophen     Tablet .. 650 milliGRAM(s) Oral every 6 hours PRN Mild Pain (1 - 3)  ketorolac   Injectable 15 milliGRAM(s) IV Push every 4 hours PRN Moderate Pain (4 - 6)  oxyCODONE    IR 5 milliGRAM(s) Oral every 6 hours PRN Severe Pain (7 - 10)

## 2023-10-27 NOTE — PROGRESS NOTE ADULT - PROBLEM SELECTOR PLAN 4
Pt takes Leflunomide (Arava) 20mg QD for Rheumatoid Arthritis. Follows Dr. Christian Vivar at [facility] for hx of Non-Hodgkin's Lymphoma in remission.     Plan:  - need to bring in leflunomide from home, unavailable in hospital pharmacy. Pt takes Leflunomide (Arava) 20mg QD for Rheumatoid Arthritis. Follows Dr. Christian Vivar at [facility] for hx of Non-Hodgkin's Lymphoma in remission.     Plan:  - Leflunomide not availble in hospital; patient is unable to have someone retrieve meds. Will discuss potential solutions with pt

## 2023-10-27 NOTE — PROGRESS NOTE ADULT - PROBLEM SELECTOR PLAN 6
Pt takes Atorvastatin 10mg QD    Plan:  - C/w home medications.
Pt takes Atorvastatin 10mg QD    Plan:  - C/w home medications.

## 2023-10-27 NOTE — DISCHARGE NOTE PROVIDER - NSDCCPCAREPLAN_GEN_ALL_CORE_FT
PRINCIPAL DISCHARGE DIAGNOSIS  Diagnosis: Pubic ramus fracture  Assessment and Plan of Treatment: You were found to have a fracture of your inferior and superior pubic ramus on left side. No surgical intervention is indication, and you will be discharged to a rehabilitation center.

## 2023-10-27 NOTE — DISCHARGE NOTE PROVIDER - NSDCCPTREATMENT_GEN_ALL_CORE_FT
PRINCIPAL PROCEDURE  Procedure: CT hip LT  Findings and Treatment: Acute displaced left superior and inferior pubic rami fractures. No other   fractures. No advanced arthritic changes.

## 2023-10-27 NOTE — PROGRESS NOTE ADULT - ATTENDING COMMENTS
77 y/o F with a PMHx of Rheumatoid Arthritis (on Leflunomide), hx of Non-Hodgkin's Lymphoma in 2014 (s/p chemo in remission, s/p thoracic spine surgery from [facility]), hypothyroidism, and HLD presenting after experiencing a fall, found with acute displaced left superior and inferior pubic rami fractures    Patient seen at bedside, pain slightly increased after PT yesterday but responds well to Toradol    #Pubic rami fracture  #Fall  - fall was mechanical in nature, tripped on curb  - no acute intervention per ortho   - pain well controlled on current regimen  - f/u vitamin D level  - PT rec [facility]    #RA  - c/w home Leflunomide if patient able to bring medication in.  Does not place at increased risk for fractures per literature review    Remainder as above    Dispo: [facility] tomorrow

## 2023-10-27 NOTE — PROGRESS NOTE ADULT - PROBLEM SELECTOR PLAN 2
Hip X-Ray 10/25: Acute displaced left superior and inferior pubic rami fractures. No other fractures. No advanced arthritic changes.     Plan:  - Ortho consulted, recommend PT and outpt follow with Dr. Haines, weight bearing as tolerated w/ assistive device  - Pain control as above  - PT/OT recommending [facility], patient prefers [facility] in [facility] where she has more social support  - f/u Vitamin D level
Hip X-Ray 10/25: Acute displaced left superior and inferior pubic rami fractures. No other fractures. No advanced arthritic changes.     Plan:  - Ortho consulted, recommend PT and outpt follow with Dr. Haines, weight bearing as tolerated w/ assistive device  - Pain control as above  - pending PT/OT consult

## 2023-10-27 NOTE — DISCHARGE NOTE PROVIDER - HOSPITAL COURSE
#Discharge: do not delete    79 yo F with a PMHx of Rheumatoid Arthritis (on Leflunomide), hx of Non-Hodgkin's Lymphoma in 2014 (s/p chemo in remission, s/p thoracic spine surgery from [facility]), hypothyroidism, and HLD presenting after experiencing a fall, found with acute displaced left superior and inferior pubic rami fractures - no ortho interventions. PT recommending [facility].    Problem List/Main Diagnoses (system-based):   #     #     #    Patient was discharged to: (home/[facility]/acute rehab/hospice, etc. and w/ home health/home PT/RN/home O2)    New medications:   Changes to old medications:  Medications that were stopped:    Items to follow up as outpatient:    Physical exam at the time of discharge:       LABS & STUDIES:   #Discharge: do not delete    79 yo F with a PMHx of Rheumatoid Arthritis (on Leflunomide), hx of Non-Hodgkin's Lymphoma in 2014 (s/p chemo in remission, s/p thoracic spine surgery from [facility]), hypothyroidism, and HLD presenting after experiencing a fall, found with acute displaced left superior and inferior pubic rami fractures - no ortho interventions. PT recommending [facility].    Problem List/Main Diagnoses (system-based):   # Left superior & inferior pubic ramus fracture    #Hypothyroidism    #RA    Patient was discharged to: [facility]    New medications: N  Changes to old medications: N  Medications that were stopped: N

## 2023-10-27 NOTE — PROGRESS NOTE ADULT - PROBLEM SELECTOR PLAN 5
Pt takes Synthroid 50mcg QD     Plan:  - C/w Synthroid 50mcg in AM.
Pt takes Synthroid 50mcg QD     Plan:  - C/w Synthroid 50mcg in AM.

## 2023-10-27 NOTE — DISCHARGE NOTE PROVIDER - NSDCMRMEDTOKEN_GEN_ALL_CORE_FT
atorvastatin 10 mg oral tablet: 1 tab(s) orally once a day (at bedtime)  leflunomide 10 mg oral tablet: 1 tab(s) orally once a day  levothyroxine 50 mcg (0.05 mg) oral capsule: 1 cap(s) orally once a day

## 2023-10-27 NOTE — PROGRESS NOTE ADULT - TIME BILLING
Review of hospital course, labs, vitals, medical records.   Bedside exam and interview    Discussed plan of care with housestaff  Documenting the encounter
Review of hospital course, labs, vitals, medical records.   Bedside exam and interview    Discussed plan of care with housestaff  Documenting the encounter

## 2023-10-27 NOTE — PROGRESS NOTE ADULT - PROBLEM SELECTOR PLAN 3
Pt with admission Hgb/Hct of 10.7/33.1, MCV of 100.6. Pt states she has a hx of MAYCOL, has monthly iron shots. Iron studies wnl, MCV now 99.4. B12/Folate wnl.    Plan:  - Transfusion goal > 7  - Maintain 2 active T&S.
Pt with admission Hgb/Hct of 10.7/33.1, MCV of 100.6. Pt states she has a hx of MAYCOL, has monthly iron shots. Iron studies wnl, MCV now 99.4.    Plan:  - F/u with B12/Folate levels  - Transfusion goal > 7  - Maintain 2 active T&S.

## 2023-10-27 NOTE — PROGRESS NOTE ADULT - PROBLEM SELECTOR PLAN 7
F: Tolerating PO  E: Replete PRN K>3.5, Mg>1.6  GI: Pantoprazole 40 mg QD  Diet: Regular  DVT: Lovenox  Dispo: [facility].

## 2023-10-27 NOTE — PROGRESS NOTE ADULT - PROBLEM SELECTOR PLAN 1
Pt reports misplacing her step on curb this afternoon 10/25. Pt with decreased strength in b/l lower extremities. Reports no pain unless she moves her hips (flexion/extension), well controlled on current regimen. EKG showed no NSR with normal axis, no AUNG/STD.     Plan:  - Tylenol 650mg Q4h PRN for mild pain  - Toradol 10mg IVP Q4h PRN for moderate pain  - Oxycodone 5mg PO Q6h PRN for severe pain   - Lidocaine patches  - Fall precautions  - PT/OT recommending [facility]
Pt reports misplacing her step on curb this afternoon 10/25. Pt with decreased strength in b/l lower extremities. Reports no pain unless she moves her hips (flexion/extension). EKG showed no NSR with normal axis, no AUNG/STD.     Plan:  - Tylenol 650mg Q4h PRN for mild pain  - Toradol 10mg IVP Q4h PRN for moderate pain  - Oxycodone 5mg PO Q6h PRN for severe pain   - Lidocaine patches  - Fall precautions  - pending PT/OT consult

## 2023-10-27 NOTE — PROGRESS NOTE ADULT - ASSESSMENT
77 yo F with a PMHx of Rheumatoid Arthritis (on Leflunomide), hx of Non-Hodgkin's Lymphoma in 2014 (s/p chemo in remission, s/p thoracic spine surgery from [facility]), hypothyroidism, and HLD presenting after experiencing a fall, found with acute displaced left superior and inferior pubic rami fractures - no ortho interventions, pending PT
77 y/o F with a PMHx of Rheumatoid Arthritis (on Leflunomide), hx of Non-Hodgkin's Lymphoma in 2014 (s/p chemo in remission, s/p thoracic spine surgery from [facility]), hypothyroidism, and HLD presenting after experiencing a fall, found with acute displaced left superior and inferior pubic rami fractures - no ortho interventions, pending PT

## 2023-10-28 ENCOUNTER — TRANSCRIPTION ENCOUNTER (OUTPATIENT)
Age: 78
End: 2023-10-28

## 2023-10-28 VITALS
RESPIRATION RATE: 19 BRPM | DIASTOLIC BLOOD PRESSURE: 61 MMHG | HEART RATE: 71 BPM | SYSTOLIC BLOOD PRESSURE: 119 MMHG | TEMPERATURE: 98 F | OXYGEN SATURATION: 95 %

## 2023-10-28 PROCEDURE — 99239 HOSP IP/OBS DSCHRG MGMT >30: CPT

## 2023-10-28 RX ADMIN — PANTOPRAZOLE SODIUM 40 MILLIGRAM(S): 20 TABLET, DELAYED RELEASE ORAL at 06:35

## 2023-10-28 RX ADMIN — Medication 15 MILLIGRAM(S): at 10:31

## 2023-10-28 RX ADMIN — Medication 50 MICROGRAM(S): at 06:06

## 2023-10-28 NOTE — DISCHARGE NOTE NURSING/CASE MANAGEMENT/SOCIAL WORK - NSDCPEFALRISK_GEN_ALL_CORE
For information on Fall & Injury Prevention, visit: [email or web address] OR  [email or web address] OR  https://www.cdc.gov/steadi/patient.html

## 2023-10-28 NOTE — DISCHARGE NOTE NURSING/CASE MANAGEMENT/SOCIAL WORK - PATIENT PORTAL LINK FT
You can access the FollowMyHealth Patient Portal offered by [facility] by registering at the following website: [email or web address]. By joining [employer]’s FollowMyHealth portal, you will also be able to view your health information using other applications (apps) compatible with our system.

## 2023-10-30 LAB
VIT D25+D1,25 OH+D1,25 PNL SERPL-MCNC: 76.3 PG/ML — SIGNIFICANT CHANGE UP (ref 19.9–79.3)
VIT D25+D1,25 OH+D1,25 PNL SERPL-MCNC: 76.3 PG/ML — SIGNIFICANT CHANGE UP (ref 19.9–79.3)

## 2023-11-02 DIAGNOSIS — E78.5 HYPERLIPIDEMIA, UNSPECIFIED: ICD-10-CM

## 2023-11-02 DIAGNOSIS — D50.9 IRON DEFICIENCY ANEMIA, UNSPECIFIED: ICD-10-CM
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
## 2023-11-02 DIAGNOSIS — E03.9 HYPOTHYROIDISM, UNSPECIFIED: ICD-10-CM

## 2023-11-02 DIAGNOSIS — S32.502A UNSPECIFIED FRACTURE OF LEFT PUBIS, INITIAL ENCOUNTER FOR CLOSED FRACTURE: ICD-10-CM
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
## 2023-11-02 DIAGNOSIS — Z79.899 OTHER LONG TERM (CURRENT) DRUG THERAPY: ICD-10-CM

## 2023-11-02 DIAGNOSIS — M06.9 RHEUMATOID ARTHRITIS, UNSPECIFIED: ICD-10-CM

## 2023-11-02 DIAGNOSIS — Z79.890 HORMONE REPLACEMENT THERAPY: ICD-10-CM

## 2023-11-02 DIAGNOSIS — Z41.8 ENCOUNTER FOR OTHER PROCEDURES FOR PURPOSES OTHER THAN REMEDYING HEALTH STATE: ICD-10-CM

## 2023-11-02 DIAGNOSIS — C85.90 NON-HODGKIN LYMPHOMA, UNSPECIFIED, UNSPECIFIED SITE: ICD-10-CM

## 2023-11-13 PROCEDURE — 83735 ASSAY OF MAGNESIUM: CPT

## 2023-11-13 PROCEDURE — 83540 ASSAY OF IRON: CPT

## 2023-11-13 PROCEDURE — 82746 ASSAY OF FOLIC ACID SERUM: CPT

## 2023-11-13 PROCEDURE — 73502 X-RAY EXAM HIP UNI 2-3 VIEWS: CPT

## 2023-11-13 PROCEDURE — 84100 ASSAY OF PHOSPHORUS: CPT

## 2023-11-13 PROCEDURE — 85027 COMPLETE CBC AUTOMATED: CPT

## 2023-11-13 PROCEDURE — 82607 VITAMIN B-12: CPT

## 2023-11-13 PROCEDURE — 84466 ASSAY OF TRANSFERRIN: CPT

## 2023-11-13 PROCEDURE — 72192 CT PELVIS W/O DYE: CPT

## 2023-11-13 PROCEDURE — 97165 OT EVAL LOW COMPLEX 30 MIN: CPT

## 2023-11-13 PROCEDURE — 36415 COLL VENOUS BLD VENIPUNCTURE: CPT

## 2023-11-13 PROCEDURE — 83550 IRON BINDING TEST: CPT

## 2023-11-13 PROCEDURE — 86901 BLOOD TYPING SEROLOGIC RH(D): CPT

## 2023-11-13 PROCEDURE — 86850 RBC ANTIBODY SCREEN: CPT

## 2023-11-13 PROCEDURE — 82728 ASSAY OF FERRITIN: CPT

## 2023-11-13 PROCEDURE — 86900 BLOOD TYPING SEROLOGIC ABO: CPT

## 2023-11-13 PROCEDURE — 82652 VIT D 1 25-DIHYDROXY: CPT

## 2023-11-13 PROCEDURE — 84443 ASSAY THYROID STIM HORMONE: CPT

## 2023-11-13 PROCEDURE — 97161 PT EVAL LOW COMPLEX 20 MIN: CPT

## 2023-11-13 PROCEDURE — 85025 COMPLETE CBC W/AUTO DIFF WBC: CPT

## 2023-11-13 PROCEDURE — 80048 BASIC METABOLIC PNL TOTAL CA: CPT

## 2023-11-13 PROCEDURE — 99285 EMERGENCY DEPT VISIT HI MDM: CPT | Mod: 25

## 2023-11-13 PROCEDURE — 80053 COMPREHEN METABOLIC PANEL: CPT
